# Patient Record
Sex: MALE | Race: WHITE | NOT HISPANIC OR LATINO | URBAN - METROPOLITAN AREA
[De-identification: names, ages, dates, MRNs, and addresses within clinical notes are randomized per-mention and may not be internally consistent; named-entity substitution may affect disease eponyms.]

---

## 2021-07-14 ENCOUNTER — INPATIENT (INPATIENT)
Facility: HOSPITAL | Age: 61
LOS: 11 days | Discharge: HOME | End: 2021-07-26
Attending: PSYCHIATRY & NEUROLOGY | Admitting: PSYCHIATRY & NEUROLOGY
Payer: COMMERCIAL

## 2021-07-14 VITALS
SYSTOLIC BLOOD PRESSURE: 130 MMHG | DIASTOLIC BLOOD PRESSURE: 86 MMHG | TEMPERATURE: 98 F | OXYGEN SATURATION: 100 % | RESPIRATION RATE: 18 BRPM | HEART RATE: 69 BPM

## 2021-07-14 NOTE — ED ADULT TRIAGE NOTE - CHIEF COMPLAINT QUOTE
Pt arrives c/o suicidal ideation. Pt states he was recently d/c earlier today from [facility] in [place] for suicide attempt. States he is having worsening suicidal thoughts. Reports he is compliant with medications. Denies HI, AH/VH, drug or ETOH use. To be seen in  as per NP. PMHx MDD    Alanna: [phone] (wife)

## 2021-07-14 NOTE — ED ADULT NURSE NOTE - OBJECTIVE STATEMENT
Pt received to behavioral health. Pt A&Ox4, ambulatory. Pt states that he was in [place] for the past 2 weeks because he had a plan to kill himself. Pt states that he went to [place] because he always wanted to see [place] and wanted to go to some place cooler since it was so hot here. Pt states that he tried to fill his car up with gasoline but stopped when he felt himself feeling bad. Pt states that he then went to the store and bought a rope and had a plan to jump off of a wendi into a river. Pt states that he never went through with that plan because [place] that made him think of his sons at home. Pt resided at home with his wife and states that he has been having martial issues over the past 20 years but they have been worsening since March of this year. Pt states that he drove home from [place] today and felt that he couldn't return home to his wife so he came to the hospital today. Pt currently endorses SI without a plan. Denies HI, hallucinations. Respirations equal and unlabored, no acute distress noted. Denies chest pain, palpitations, SOB, headaches, dizziness, weakness, fever, cough, chills, N/V/D, recent sick contacts. PMH MDD, pt states he is compliant with his medications. Pt undressed, belongings and valuables secured in  locker 5. Vital signs as noted, comfort measures provided, safety maintained. Assessment ongoing. Pt received to behavioral health. Pt A&Ox4, ambulatory. Pt states that he was in [place] for the past 2 weeks because he had a plan to kill himself. Pt states that he went to [place] because he always wanted to see [place] and wanted to go to some place cooler since it was so hot here. Pt states that he tried to fill his car up with carbon monoxide but stopped when he starting feeling sick. Pt states that he then went to the store and bought a rope and had a plan to jump off of a wendi into a river. Pt states that he never went through with that plan because he [place] a [place] at the [place] that made him think of his sons at home. Pt resides at home with his wife and states that he has been having martial issues over the past 20 years but they have been worsening since March of this year. Pt states that he drove home from [place] today and felt that he couldn't return home to his wife since he was felt that he would drive off again, so he came to the hospital today. Pt currently endorses SI without a plan. Denies HI, hallucinations. Respirations equal and unlabored, no acute distress noted. Denies chest pain, palpitations, SOB, headaches, dizziness, weakness, fever, cough, chills, N/V/D, recent sick contacts. PMH MDD, pt states he is compliant with his medications. Pt undressed, belongings and valuables secured in  locker 5. Vital signs as noted, comfort measures provided, safety maintained. Assessment ongoing. Pt received to behavioral health. Pt A&Ox4, ambulatory. Pt states that he was in [place] for the past 2 weeks because he had a plan to kill himself. Pt states that he went to [place] because he always wanted to see [place] and wanted to go to some place cooler since it was so hot here. Pt states that he tried to fill his car up with carbon monoxide but stopped when he starting feeling sick. Pt states that he then went to the store and bought a rope and had a plan to jump off of a wendi into a river. Pt states that he never went through with that plan because he [place] a [place] that made him think of his sons at home. Pt resides at home with his wife and states that he has been having martial issues over the past 20 years but they have been worsening since March of this year. Pt states that he drove home from [place] today and felt that he couldn't return home to his wife since he was felt that he would drive off again, so he came to the hospital today. Pt currently endorses SI without a plan. Denies HI, hallucinations. Respirations equal and unlabored, no acute distress noted. Denies chest pain, palpitations, SOB, headaches, dizziness, weakness, fever, cough, chills, N/V/D, recent sick contacts. PMH MDD, pt states he is compliant with his medications. Pt undressed, belongings and valuables secured in  locker 5. Labs drawn and sent as ordered. EKG performed. Vital signs as noted, comfort measures provided, safety maintained. Assessment ongoing.

## 2021-07-14 NOTE — ED PROVIDER NOTE - CLINICAL SUMMARY MEDICAL DECISION MAKING FREE TEXT BOX
This is a 61 yr M, pmh MDD with c/o anxiety, depression , and si. He reports was discharged today from [facility] hospital at [place]. He was there since July 1 because of si and attempt , via co and rope. PT states he did not finish it because saw 2 boys and reminded him of his son. Pt states he has marital problems, afraid to go home because believes he would take off again and finish the job.   Psych consult requested-

## 2021-07-14 NOTE — ED PROVIDER NOTE - OBJECTIVE STATEMENT
This is a 61 yr M, pmh MDD with c/o anxiety, depression , and si. He reports was discharged today from [facility] at [place]. He was there since July 1 because of si and attempt , via co and rope. PT states he did not finish it because saw 2 boys and reminded him of his son. Pt states he has marital problems, afraid to go home because believes he would take off again and finish the job.

## 2021-07-14 NOTE — ED ADULT NURSE NOTE - CHIEF COMPLAINT QUOTE
Pt arrives c/o suicidal ideation. Pt states he was recently d/c earlier today from [facility] in [place] for suicide attempt. States he is having worsening suicidal thoughts. Reports he is compliant with medications. Denies HI, AH/VH, drug or ETOH use. To be seen in  as per NP. PMHx MDD    Alanna: [phone] (wife)

## 2021-07-14 NOTE — ED ADULT TRIAGE NOTE - RESPIRATORY RATE (BREATHS/MIN)
18 Vital Signs Last 24 Hrs  T(C): 36.8 (28 Jun 2021 00:46), Max: 37 (27 Jun 2021 20:55)  T(F): 98.2 (28 Jun 2021 00:46), Max: 98.6 (27 Jun 2021 20:55)  HR: 66 (28 Jun 2021 00:46) (66 - 82)  BP: 125/83 (28 Jun 2021 00:46) (112/75 - 129/85)  BP(mean): --  RR: 17 (28 Jun 2021 00:46) (16 - 17)  SpO2: 100% (28 Jun 2021 00:46) (100% - 100%)                          10.3   8.07  )-----------( 302      ( 27 Jun 2021 18:45 )             32.5     06-27    139  |  106  |  17  ----------------------------<  90  4.3   |  24  |  0.84    Ca    8.5      27 Jun 2021 18:42  Mg     2.1     06-27    TPro  5.6<L>  /  Alb  3.6  /  TBili  <0.2  /  DBili  x   /  AST  16  /  ALT  9   /  AlkPhos  40  06-27    Troponin: 24    COVID 19 PCR: Negative    CXR:   FINDINGS:    No acute osseous abnormality. The cardiomediastinal silhouette is unremarkable. The lungs are clear. There is no pleural effusion or pneumothorax.    IMPRESSION:    Clear lungs.    EKG: Sinus rhythm at 77 bpm. QT/QTc 370/418.

## 2021-07-14 NOTE — ED PROVIDER NOTE - PROGRESS NOTE DETAILS
CLARITA: I was signed out this pt pending Bed availability for psychiatric care. Pt has no complaints. Will continue to monitor pending bed. Preston: Pt was signed out pending bed availability. Pt to be admitted to [facility], Dr. Brown.

## 2021-07-15 DIAGNOSIS — F60.5 OBSESSIVE-COMPULSIVE PERSONALITY DISORDER: ICD-10-CM

## 2021-07-15 DIAGNOSIS — F33.2 MAJOR DEPRESSIVE DISORDER, RECURRENT SEVERE WITHOUT PSYCHOTIC FEATURES: ICD-10-CM

## 2021-07-15 LAB
ALBUMIN SERPL ELPH-MCNC: 4.3 G/DL — SIGNIFICANT CHANGE UP (ref 3.3–5)
ALP SERPL-CCNC: 50 U/L — SIGNIFICANT CHANGE UP (ref 40–120)
ALT FLD-CCNC: 20 U/L — SIGNIFICANT CHANGE UP (ref 4–41)
AMPHET UR-MCNC: NEGATIVE — SIGNIFICANT CHANGE UP
ANION GAP SERPL CALC-SCNC: 13 MMOL/L — SIGNIFICANT CHANGE UP (ref 7–14)
APAP SERPL-MCNC: <15 UG/ML — SIGNIFICANT CHANGE UP (ref 15–25)
APPEARANCE UR: CLEAR — SIGNIFICANT CHANGE UP
AST SERPL-CCNC: 16 U/L — SIGNIFICANT CHANGE UP (ref 4–40)
BACTERIA # UR AUTO: NEGATIVE — SIGNIFICANT CHANGE UP
BARBITURATES UR SCN-MCNC: NEGATIVE — SIGNIFICANT CHANGE UP
BASOPHILS # BLD AUTO: 0.09 K/UL — SIGNIFICANT CHANGE UP (ref 0–0.2)
BASOPHILS NFR BLD AUTO: 1.1 % — SIGNIFICANT CHANGE UP (ref 0–2)
BENZODIAZ UR-MCNC: NEGATIVE — SIGNIFICANT CHANGE UP
BILIRUB SERPL-MCNC: 0.3 MG/DL — SIGNIFICANT CHANGE UP (ref 0.2–1.2)
BILIRUB UR-MCNC: NEGATIVE — SIGNIFICANT CHANGE UP
BUN SERPL-MCNC: 13 MG/DL — SIGNIFICANT CHANGE UP (ref 7–23)
CALCIUM SERPL-MCNC: 8.9 MG/DL — SIGNIFICANT CHANGE UP (ref 8.4–10.5)
CHLORIDE SERPL-SCNC: 102 MMOL/L — SIGNIFICANT CHANGE UP (ref 98–107)
CO2 SERPL-SCNC: 24 MMOL/L — SIGNIFICANT CHANGE UP (ref 22–31)
COCAINE METAB.OTHER UR-MCNC: NEGATIVE — SIGNIFICANT CHANGE UP
COLOR SPEC: YELLOW — SIGNIFICANT CHANGE UP
COVID-19 SPIKE DOMAIN AB INTERP: NEGATIVE — SIGNIFICANT CHANGE UP
COVID-19 SPIKE DOMAIN ANTIBODY RESULT: 0.4 U/ML — SIGNIFICANT CHANGE UP
CREAT SERPL-MCNC: 1.35 MG/DL — HIGH (ref 0.5–1.3)
CREATININE URINE RESULT, DAU: 160 MG/DL — SIGNIFICANT CHANGE UP
DIFF PNL FLD: NEGATIVE — SIGNIFICANT CHANGE UP
EOSINOPHIL # BLD AUTO: 0.24 K/UL — SIGNIFICANT CHANGE UP (ref 0–0.5)
EOSINOPHIL NFR BLD AUTO: 3 % — SIGNIFICANT CHANGE UP (ref 0–6)
ETHANOL SERPL-MCNC: <10 MG/DL — SIGNIFICANT CHANGE UP
GLUCOSE SERPL-MCNC: 89 MG/DL — SIGNIFICANT CHANGE UP (ref 70–99)
GLUCOSE UR QL: NEGATIVE — SIGNIFICANT CHANGE UP
HCT VFR BLD CALC: 44.6 % — SIGNIFICANT CHANGE UP (ref 39–50)
HGB BLD-MCNC: 14.5 G/DL — SIGNIFICANT CHANGE UP (ref 13–17)
IANC: 4.81 K/UL — SIGNIFICANT CHANGE UP (ref 1.5–8.5)
IMM GRANULOCYTES NFR BLD AUTO: 0.4 % — SIGNIFICANT CHANGE UP (ref 0–1.5)
KETONES UR-MCNC: NEGATIVE — SIGNIFICANT CHANGE UP
LEUKOCYTE ESTERASE UR-ACNC: NEGATIVE — SIGNIFICANT CHANGE UP
LITHIUM SERPL-MCNC: 0.5 MMOL/L — LOW (ref 0.6–1.2)
LYMPHOCYTES # BLD AUTO: 2.19 K/UL — SIGNIFICANT CHANGE UP (ref 1–3.3)
LYMPHOCYTES # BLD AUTO: 27.7 % — SIGNIFICANT CHANGE UP (ref 13–44)
MCHC RBC-ENTMCNC: 29.8 PG — SIGNIFICANT CHANGE UP (ref 27–34)
MCHC RBC-ENTMCNC: 32.5 GM/DL — SIGNIFICANT CHANGE UP (ref 32–36)
MCV RBC AUTO: 91.8 FL — SIGNIFICANT CHANGE UP (ref 80–100)
METHADONE UR-MCNC: NEGATIVE — SIGNIFICANT CHANGE UP
MONOCYTES # BLD AUTO: 0.55 K/UL — SIGNIFICANT CHANGE UP (ref 0–0.9)
MONOCYTES NFR BLD AUTO: 7 % — SIGNIFICANT CHANGE UP (ref 2–14)
NEUTROPHILS # BLD AUTO: 4.81 K/UL — SIGNIFICANT CHANGE UP (ref 1.8–7.4)
NEUTROPHILS NFR BLD AUTO: 60.8 % — SIGNIFICANT CHANGE UP (ref 43–77)
NITRITE UR-MCNC: NEGATIVE — SIGNIFICANT CHANGE UP
NRBC # BLD: 0 /100 WBCS — SIGNIFICANT CHANGE UP
NRBC # FLD: 0 K/UL — SIGNIFICANT CHANGE UP
OPIATES UR-MCNC: NEGATIVE — SIGNIFICANT CHANGE UP
OXYCODONE UR-MCNC: NEGATIVE — SIGNIFICANT CHANGE UP
PCP SPEC-MCNC: SIGNIFICANT CHANGE UP
PCP UR-MCNC: NEGATIVE — SIGNIFICANT CHANGE UP
PH UR: 6.5 — SIGNIFICANT CHANGE UP (ref 5–8)
PLATELET # BLD AUTO: 250 K/UL — SIGNIFICANT CHANGE UP (ref 150–400)
POTASSIUM SERPL-MCNC: 4.1 MMOL/L — SIGNIFICANT CHANGE UP (ref 3.5–5.3)
POTASSIUM SERPL-SCNC: 4.1 MMOL/L — SIGNIFICANT CHANGE UP (ref 3.5–5.3)
PROT SERPL-MCNC: 6.8 G/DL — SIGNIFICANT CHANGE UP (ref 6–8.3)
PROT UR-MCNC: ABNORMAL
RBC # BLD: 4.86 M/UL — SIGNIFICANT CHANGE UP (ref 4.2–5.8)
RBC # FLD: 13 % — SIGNIFICANT CHANGE UP (ref 10.3–14.5)
RBC CASTS # UR COMP ASSIST: 1 /HPF — SIGNIFICANT CHANGE UP (ref 0–4)
SALICYLATES SERPL-MCNC: <5 MG/DL — LOW (ref 15–30)
SARS-COV-2 IGG+IGM SERPL QL IA: 0.4 U/ML — SIGNIFICANT CHANGE UP
SARS-COV-2 IGG+IGM SERPL QL IA: NEGATIVE — SIGNIFICANT CHANGE UP
SARS-COV-2 RNA SPEC QL NAA+PROBE: SIGNIFICANT CHANGE UP
SODIUM SERPL-SCNC: 139 MMOL/L — SIGNIFICANT CHANGE UP (ref 135–145)
SP GR SPEC: 1.02 — SIGNIFICANT CHANGE UP (ref 1.01–1.02)
THC UR QL: NEGATIVE — SIGNIFICANT CHANGE UP
TOXICOLOGY SCREEN, DRUGS OF ABUSE, SERUM RESULT: SIGNIFICANT CHANGE UP
TSH SERPL-MCNC: 8.35 UIU/ML — HIGH (ref 0.27–4.2)
UROBILINOGEN FLD QL: SIGNIFICANT CHANGE UP
WBC # BLD: 7.91 K/UL — SIGNIFICANT CHANGE UP (ref 3.8–10.5)
WBC # FLD AUTO: 7.91 K/UL — SIGNIFICANT CHANGE UP (ref 3.8–10.5)
WBC UR QL: 1 /HPF — SIGNIFICANT CHANGE UP (ref 0–5)

## 2021-07-15 PROCEDURE — 99285 EMERGENCY DEPT VISIT HI MDM: CPT

## 2021-07-15 RX ORDER — FLUVOXAMINE MALEATE 25 MG/1
200 TABLET ORAL DAILY
Refills: 0 | Status: DISCONTINUED | OUTPATIENT
Start: 2021-07-15 | End: 2021-07-26

## 2021-07-15 RX ORDER — LITHIUM CARBONATE 300 MG/1
600 TABLET, EXTENDED RELEASE ORAL DAILY
Refills: 0 | Status: DISCONTINUED | OUTPATIENT
Start: 2021-07-15 | End: 2021-07-15

## 2021-07-15 RX ORDER — HYDROXYZINE HCL 10 MG
25 TABLET ORAL EVERY 6 HOURS
Refills: 0 | Status: DISCONTINUED | OUTPATIENT
Start: 2021-07-15 | End: 2021-07-19

## 2021-07-15 RX ORDER — LITHIUM CARBONATE 300 MG/1
750 TABLET, EXTENDED RELEASE ORAL DAILY
Refills: 0 | Status: DISCONTINUED | OUTPATIENT
Start: 2021-07-15 | End: 2021-07-15

## 2021-07-15 RX ORDER — LITHIUM CARBONATE 300 MG/1
750 TABLET, EXTENDED RELEASE ORAL DAILY
Refills: 0 | Status: DISCONTINUED | OUTPATIENT
Start: 2021-07-15 | End: 2021-07-16

## 2021-07-15 NOTE — ED BEHAVIORAL HEALTH ASSESSMENT NOTE - SUMMARY
Pt is a 62 y/o male domiciled, employed as a  (currently on medical leave), with PPH of MDD, 2 past inpatient hospitalization, most recent at [facility] in [place] (d/c today 7/14/21), reports of  2 past aborted SA (contemplating shooting himself with a shot gun and 2weeks attempted via carbon monoxide poisoning) currently in outpatient treatment with Dr Luke Brwon, prescribed Luvox 200 mg and lithium 600 mg daily, no reported hx of violence/aggression/legal issues, denies past and current substance use, social Etoh use, no PMH, today presented to the ED after being d/c from inpatient unit in [place] for worsening suicidal thoughts.    Pt presents to the ED in the context of suicidal ideation. Pt endorses worsening depressive symptoms with anxious distress and acute onset of recent incidents of suicidal ideation with plan to hang himself. He is unable to safety plan outside of the hospital and is requesting voluntary admission.  - there are no beds, no will hold in the ED until a bed becomes available Pt is a 62 y/o male domiciled, employed as a  (currently on medical leave), with PPH of MDD, 2 past inpatient hospitalization, most recent at [facility] in [place] (d/c today 7/14/21), reports of  2 past aborted SA (contemplating shooting himself with a shot gun and on 6/30/21, attempted via carbon monoxide poisoning) currently in outpatient treatment with Dr Luke Brown, prescribed Luvox 200 mg and lithium 600 mg daily, no reported hx of violence/aggression/legal issues, denies past and current substance use, social Etoh use, no PMH, today presented to the ED after being d/c from inpatient unit in [place] for worsening suicidal thoughts.      Pt presents to the ED in the context of suicidal ideation. Pt endorses worsening depressive symptoms with anxious distress and acute onset of recent incidents of suicidal ideation with plan to hang himself. He is unable to safety plan outside of the hospital and is requesting voluntary admission.  - there are no beds available so will hold in the ED until a bed becomes available.

## 2021-07-15 NOTE — ED ADULT NURSE REASSESSMENT NOTE - NS ED NURSE REASSESS COMMENT FT1
Pt sleeping comfortably at this time. Nonverbal indicators of pain absent. Respirations equal and unlabored, no acute distress noted. Pt awaiting inpatient psychiatric admission when bed made available. Assessment ongoing.

## 2021-07-15 NOTE — ED BEHAVIORAL HEALTH ASSESSMENT NOTE - DESCRIPTION
see hpi none Anxious appearing but cooperative   Vital Signs Last 24 Hrs  T(C): 36.8 (14 Jul 2021 22:52), Max: 36.8 (14 Jul 2021 22:52)  T(F): 98.2 (14 Jul 2021 22:52), Max: 98.2 (14 Jul 2021 22:52)  HR: 69 (14 Jul 2021 22:52) (69 - 69)  BP: 130/86 (14 Jul 2021 22:52) (130/86 - 130/86)  BP(mean): --  RR: 18 (14 Jul 2021 22:52) (18 - 18)  SpO2: 100% (14 Jul 2021 22:52) (100% - 100%)
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
## 2021-07-15 NOTE — ED BEHAVIORAL HEALTH ASSESSMENT NOTE - PSYCHIATRIC ISSUES AND PLAN (INCLUDE STANDING AND PRN MEDICATION)
continue Fluvoxamine 200mg daily, lithium 600 mg daily, prn hydroxyzine 25 mg for anxiety, ativan 2 mg PO/Im for severe anxiety/agitation

## 2021-07-15 NOTE — BH INPATIENT PSYCHIATRY ASSESSMENT NOTE - NSCOMMENTSUICRISKFACT_PSY_ALL_CORE
Risk factors include Hx of SI and suicidal behavior, recent hospitalization, male gender, mood disorder, personality disorder traits.

## 2021-07-15 NOTE — ED BEHAVIORAL HEALTH NOTE - BEHAVIORAL HEALTH NOTE
Patient seen this AM.   He reports that he did not sleep very well because of the other noises in the ED.   He is continuing to report SI and continues to request voluntary psych hospitalization.     Vital Signs Last 24 Hrs  T(C): 36.9 (15 Jul 2021 12:39), Max: 36.9 (15 Jul 2021 12:39)  T(F): 98.5 (15 Jul 2021 12:39), Max: 98.5 (15 Jul 2021 12:39)  HR: 72 (15 Jul 2021 12:39) (69 - 77)  BP: 124/82 (15 Jul 2021 12:39) (111/69 - 130/86)  BP(mean): --  RR: 16 (15 Jul 2021 12:39) (16 - 18)  SpO2: 100% (15 Jul 2021 12:39) (100% - 100%)    Pt will be admitted to L4 on 9.13 status.   Handoff left for Frances RHODES voicemail.

## 2021-07-15 NOTE — BH INPATIENT PSYCHIATRY ASSESSMENT NOTE - NSSUICPROTFACT_PSY_ALL_CORE
Responsibility to children, family, or others/Supportive social network of family or friends/Fear of death or the actual act of killing self/Positive therapeutic relationships

## 2021-07-15 NOTE — BH INPATIENT PSYCHIATRY ASSESSMENT NOTE - RISK ASSESSMENT
Pt presents as an imminent risk of harm to self due to his current depression and anxious distress, recent suicide attempt, and continued expression of suicidal ideation. Protective factors include his support wife, engaged in treatment, and wants to get better.   He requires inpatient hospitalization due to his persistent suicidality.
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
## 2021-07-15 NOTE — BH INPATIENT PSYCHIATRY ASSESSMENT NOTE - NSBHMETABOLIC_PSY_ALL_CORE_FT
BMI: BMI (kg/m2): 25.4 (07-15-21 @ 14:25)  HbA1c:   Glucose:   BP: 124/82 (07-15-21 @ 12:39) (111/69 - 130/86)  Lipid Panel:
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
## 2021-07-15 NOTE — ED ADULT NURSE REASSESSMENT NOTE - NS ED NURSE REASSESS COMMENT FT1
Pt A&Ox4. Pt awake, calm and cooperative at this time. Pt states that he was able to sleep well tonight. Pt without complaints at this time. Respirations equal and unlabored, no acute distress noted. Denies chest pain, palpitations, SOB, headaches, dizziness, weakness. Vital signs as noted, comfort measures provided, safety maintained. Pt awaiting inpatient psychiatric admission when bed is made available. Assessment ongoing.
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
## 2021-07-15 NOTE — BH INPATIENT PSYCHIATRY ASSESSMENT NOTE - CURRENT MEDICATION
MEDICATIONS  (STANDING):  fluvoxaMINE 200 milliGRAM(s) Oral daily  lithium 750 milliGRAM(s) Oral daily    MEDICATIONS  (PRN):  hydrOXYzine hydrochloride 25 milliGRAM(s) Oral every 6 hours PRN Anxiety  LORazepam     Tablet 2 milliGRAM(s) Oral every 6 hours PRN severe anxiety  LORazepam   Injectable 2 milliGRAM(s) IntraMuscular once PRN severe anxiety

## 2021-07-15 NOTE — ED BEHAVIORAL HEALTH NOTE - BEHAVIORAL HEALTH NOTE
COVID Exposure Screen- Patient     *Have you had a COVID-19 test in the last 90 days?  (  X) Yes   (  ) No   (  ) Unknown- Reason: _____     IF YES PROCEED TO QUESTION #2. IF NO OR UNKNOWN, PLEASE SKIP TO QUESTION #3.     Date of test(s) and result(s): ____7/1/21 (-)____     *Have you tested positive for COVID-19 antibodies? (  ) Yes   ( X ) No   (  ) Unknown- Reason: _____     IF YES PROCEED TO QUESTION #4. IF NO or UNKNOWN, PLEASE SKIP TO QUESTION #5.     Date of positive antibody test: ________     *Have you received 2 doses of the COVID-19 vaccine? (  ) Yes   ( X ) No   (  ) Unknown- Reason: _____      IF YES PROCEED TO QUESTION #6. IF NO or UNKNOWN, PLEASE SKIP TO QUESTION #7.     Date of second dose: ________     *In the past 10 days, have you been around anyone with a positive COVID-19 test?* (  ) Yes   (X  ) No   (  ) Unknown- Reason: ____     IF YES PROCEED TO QUESTION #8. IF NO or UNKNOWN, PLEASE SKIP TO QUESTION #13.     Were you within 6 feet of them for at least 15 minutes? (  ) Yes   (  ) No   (  ) Unknown- Reason: _____     Have you provided care for them? (  ) Yes   (  ) No   (  ) Unknown- Reason: ______     Have you had direct physical contact with them (touched, hugged, or kissed them)? (  ) Yes   (  ) No    (  ) Unknown- Reason: _____     Have you shared eating or drinking utensils with them? (  ) Yes   (  ) No    (  ) Unknown- Reason: ____     Have they sneezed, coughed, or somehow gotten respiratory droplets on you? (  ) Yes   (  ) No    (  ) Unknown- Reason: ______     *Have you been out of [place] within the past 10 days?* (  X) Yes   (  ) No   (  ) Unknown- Reason: _____     IF YES PLEASE ANSWER THE FOLLOWING QUESTIONS:     Which state/country have you been to? __[place] ____     Were you there over 24 hours? ( X ) Yes   (  ) No    (  ) Unknown- Reason: ______     Date of return to [facility]: __7/14/21____

## 2021-07-15 NOTE — ED BEHAVIORAL HEALTH ASSESSMENT NOTE - RISK ASSESSMENT
Pt presents as an imminent risk of harm to self due to his current depression and anxious distress, recent suicide attempt, and continued expression of suicidal ideation. Protective factors include his support wife, engaged in treatment, and wants to get better.     He requires inpatient hospitalization due to his persistent suicidality. High Acute Suicide Risk Pt presents as an imminent risk of harm to self due to his current depression and anxious distress, recent suicide attempt, and continued expression of suicidal ideation. Protective factors include his support wife, engaged in treatment, and wants to get better.   He requires inpatient hospitalization due to his persistent suicidality.

## 2021-07-15 NOTE — BH INPATIENT PSYCHIATRY ASSESSMENT NOTE - NSBHCHARTREVIEWVS_PSY_A_CORE FT
Vital Signs Last 24 Hrs  T(C): 36.5 (15 Jul 2021 14:40), Max: 36.9 (15 Jul 2021 12:39)  T(F): 97.7 (15 Jul 2021 14:40), Max: 98.5 (15 Jul 2021 12:39)  HR: 72 (15 Jul 2021 12:39) (69 - 77)  BP: 124/82 (15 Jul 2021 12:39) (111/69 - 130/86)  BP(mean): --  RR: 16 (15 Jul 2021 14:40) (16 - 18)  SpO2: 99% (15 Jul 2021 14:25) (99% - 100%)

## 2021-07-16 LAB
A1C WITH ESTIMATED AVERAGE GLUCOSE RESULT: 5.5 % — SIGNIFICANT CHANGE UP (ref 4–5.6)
ALBUMIN SERPL ELPH-MCNC: 4.4 G/DL — SIGNIFICANT CHANGE UP (ref 3.3–5)
ALP SERPL-CCNC: 53 U/L — SIGNIFICANT CHANGE UP (ref 40–120)
ALT FLD-CCNC: 20 U/L — SIGNIFICANT CHANGE UP (ref 4–41)
ANION GAP SERPL CALC-SCNC: 15 MMOL/L — HIGH (ref 7–14)
AST SERPL-CCNC: 21 U/L — SIGNIFICANT CHANGE UP (ref 4–40)
BILIRUB SERPL-MCNC: 0.6 MG/DL — SIGNIFICANT CHANGE UP (ref 0.2–1.2)
BUN SERPL-MCNC: 14 MG/DL — SIGNIFICANT CHANGE UP (ref 7–23)
CALCIUM SERPL-MCNC: 9.5 MG/DL — SIGNIFICANT CHANGE UP (ref 8.4–10.5)
CHLORIDE SERPL-SCNC: 99 MMOL/L — SIGNIFICANT CHANGE UP (ref 98–107)
CHOLEST SERPL-MCNC: 176 MG/DL — SIGNIFICANT CHANGE UP
CO2 SERPL-SCNC: 22 MMOL/L — SIGNIFICANT CHANGE UP (ref 22–31)
CREAT SERPL-MCNC: 1.44 MG/DL — HIGH (ref 0.5–1.3)
ESTIMATED AVERAGE GLUCOSE: 111 — SIGNIFICANT CHANGE UP
GLUCOSE SERPL-MCNC: 85 MG/DL — SIGNIFICANT CHANGE UP (ref 70–99)
HDLC SERPL-MCNC: 49 MG/DL — SIGNIFICANT CHANGE UP
LIPID PNL WITH DIRECT LDL SERPL: 98 MG/DL — SIGNIFICANT CHANGE UP
NON HDL CHOLESTEROL: 127 MG/DL — SIGNIFICANT CHANGE UP
POTASSIUM SERPL-MCNC: 4.6 MMOL/L — SIGNIFICANT CHANGE UP (ref 3.5–5.3)
POTASSIUM SERPL-SCNC: 4.6 MMOL/L — SIGNIFICANT CHANGE UP (ref 3.5–5.3)
PROT SERPL-MCNC: 7.2 G/DL — SIGNIFICANT CHANGE UP (ref 6–8.3)
SODIUM SERPL-SCNC: 136 MMOL/L — SIGNIFICANT CHANGE UP (ref 135–145)
TRIGL SERPL-MCNC: 147 MG/DL — SIGNIFICANT CHANGE UP

## 2021-07-16 PROCEDURE — 99232 SBSQ HOSP IP/OBS MODERATE 35: CPT | Mod: 25

## 2021-07-16 PROCEDURE — 90853 GROUP PSYCHOTHERAPY: CPT

## 2021-07-16 RX ORDER — ARIPIPRAZOLE 15 MG/1
5 TABLET ORAL DAILY
Refills: 0 | Status: DISCONTINUED | OUTPATIENT
Start: 2021-07-16 | End: 2021-07-19

## 2021-07-16 RX ADMIN — FLUVOXAMINE MALEATE 200 MILLIGRAM(S): 25 TABLET ORAL at 08:02

## 2021-07-16 RX ADMIN — LITHIUM CARBONATE 750 MILLIGRAM(S): 300 TABLET, EXTENDED RELEASE ORAL at 08:02

## 2021-07-16 NOTE — PSYCHIATRIC REHAB INITIAL EVALUATION - NSBHPRRECOMMEND_PSY_ALL_CORE
Writer met with patient in order to orient patient to unit and briefly introduce patient to psychiatric rehabilitation staff and department function. Patient was provided with copy of unit schedule. Patient is a reliable historian. Patient reports currently being admitted due to increased SI and anxiety. Patient states that anxiety and SI has been triggered by difficulties in current marriage. Pt states that he recently had thoughts to hang self, shoot self with shotgun, and poison self in car before seeking treatment. Patient denies current SI and is able to contract for safety. Patient and writer established a collaborative psychiatric rehabilitation goal. Writer encouraged patient to attend psychiatric rehabilitation groups and engage in treatment. Psychiatric rehabilitation staff will engage patient daily.     Patient was encouraged by writer to practice social distancing while on unit and wear face mask. Patient was receptive.
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
## 2021-07-16 NOTE — BH SOCIAL WORK INITIAL PSYCHOSOCIAL EVALUATION - NSBHBARRIERS_PSY_ALL_CORE
Chart is reviewed. Patient has history of palpitations and some ectopy. Has significant anxiety. If he has recurrent problems  get a 48-hour Holter monitor and follow-up.
Poor judgement

## 2021-07-16 NOTE — BH SOCIAL WORK INITIAL PSYCHOSOCIAL EVALUATION - BILL PAYMENT
This [facility] RN assisted Dr. Rosalinda Menard with recommendation for a RB stereo biopsy for density. Procedure reviewed and all questions answered. Emotional and educational support given.    On the day of the biopsy, pt instructed to take Tylenol 1000mg PO, ea no
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
## 2021-07-16 NOTE — BH SOCIAL WORK INITIAL PSYCHOSOCIAL EVALUATION - NSCMSPTSTRENGTHS_PSY_ALL_CORE
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

## 2021-07-17 PROCEDURE — 99231 SBSQ HOSP IP/OBS SF/LOW 25: CPT

## 2021-07-17 RX ADMIN — Medication 2 MILLIGRAM(S): at 21:27

## 2021-07-17 RX ADMIN — ARIPIPRAZOLE 5 MILLIGRAM(S): 15 TABLET ORAL at 08:09

## 2021-07-17 RX ADMIN — FLUVOXAMINE MALEATE 200 MILLIGRAM(S): 25 TABLET ORAL at 08:10

## 2021-07-18 PROCEDURE — 99231 SBSQ HOSP IP/OBS SF/LOW 25: CPT

## 2021-07-18 RX ORDER — ACETAMINOPHEN 500 MG
650 TABLET ORAL EVERY 6 HOURS
Refills: 0 | Status: DISCONTINUED | OUTPATIENT
Start: 2021-07-18 | End: 2021-07-26

## 2021-07-18 RX ADMIN — ARIPIPRAZOLE 5 MILLIGRAM(S): 15 TABLET ORAL at 08:28

## 2021-07-18 RX ADMIN — FLUVOXAMINE MALEATE 200 MILLIGRAM(S): 25 TABLET ORAL at 08:28

## 2021-07-18 RX ADMIN — Medication 650 MILLIGRAM(S): at 12:38

## 2021-07-18 RX ADMIN — Medication 650 MILLIGRAM(S): at 13:00

## 2021-07-18 RX ADMIN — Medication 2 MILLIGRAM(S): at 21:09

## 2021-07-19 LAB
ALBUMIN SERPL ELPH-MCNC: 4.5 G/DL — SIGNIFICANT CHANGE UP (ref 3.3–5)
ALP SERPL-CCNC: 53 U/L — SIGNIFICANT CHANGE UP (ref 40–120)
ALT FLD-CCNC: 22 U/L — SIGNIFICANT CHANGE UP (ref 4–41)
ANION GAP SERPL CALC-SCNC: 14 MMOL/L — SIGNIFICANT CHANGE UP (ref 7–14)
AST SERPL-CCNC: 16 U/L — SIGNIFICANT CHANGE UP (ref 4–40)
BILIRUB SERPL-MCNC: 0.5 MG/DL — SIGNIFICANT CHANGE UP (ref 0.2–1.2)
BUN SERPL-MCNC: 16 MG/DL — SIGNIFICANT CHANGE UP (ref 7–23)
CALCIUM SERPL-MCNC: 9.3 MG/DL — SIGNIFICANT CHANGE UP (ref 8.4–10.5)
CHLORIDE SERPL-SCNC: 100 MMOL/L — SIGNIFICANT CHANGE UP (ref 98–107)
CO2 SERPL-SCNC: 26 MMOL/L — SIGNIFICANT CHANGE UP (ref 22–31)
CREAT SERPL-MCNC: 1.38 MG/DL — HIGH (ref 0.5–1.3)
GLUCOSE SERPL-MCNC: 95 MG/DL — SIGNIFICANT CHANGE UP (ref 70–99)
POTASSIUM SERPL-MCNC: 4.2 MMOL/L — SIGNIFICANT CHANGE UP (ref 3.5–5.3)
POTASSIUM SERPL-SCNC: 4.2 MMOL/L — SIGNIFICANT CHANGE UP (ref 3.5–5.3)
PROT SERPL-MCNC: 6.9 G/DL — SIGNIFICANT CHANGE UP (ref 6–8.3)
SODIUM SERPL-SCNC: 140 MMOL/L — SIGNIFICANT CHANGE UP (ref 135–145)
T4 FREE SERPL-MCNC: 1.2 NG/DL — SIGNIFICANT CHANGE UP (ref 0.9–1.8)
TSH SERPL-MCNC: 2.24 UIU/ML — SIGNIFICANT CHANGE UP (ref 0.27–4.2)

## 2021-07-19 PROCEDURE — 99232 SBSQ HOSP IP/OBS MODERATE 35: CPT

## 2021-07-19 PROCEDURE — 90837 PSYTX W PT 60 MINUTES: CPT

## 2021-07-19 RX ORDER — HYDROXYZINE HCL 10 MG
50 TABLET ORAL EVERY 6 HOURS
Refills: 0 | Status: DISCONTINUED | OUTPATIENT
Start: 2021-07-19 | End: 2021-07-26

## 2021-07-19 RX ORDER — QUETIAPINE FUMARATE 200 MG/1
50 TABLET, FILM COATED ORAL AT BEDTIME
Refills: 0 | Status: DISCONTINUED | OUTPATIENT
Start: 2021-07-19 | End: 2021-07-20

## 2021-07-19 RX ADMIN — QUETIAPINE FUMARATE 50 MILLIGRAM(S): 200 TABLET, FILM COATED ORAL at 21:12

## 2021-07-19 RX ADMIN — FLUVOXAMINE MALEATE 200 MILLIGRAM(S): 25 TABLET ORAL at 08:07

## 2021-07-20 LAB — SARS-COV-2 RNA SPEC QL NAA+PROBE: SIGNIFICANT CHANGE UP

## 2021-07-20 PROCEDURE — 90853 GROUP PSYCHOTHERAPY: CPT

## 2021-07-20 PROCEDURE — 99232 SBSQ HOSP IP/OBS MODERATE 35: CPT | Mod: 25

## 2021-07-20 RX ORDER — QUETIAPINE FUMARATE 200 MG/1
100 TABLET, FILM COATED ORAL AT BEDTIME
Refills: 0 | Status: DISCONTINUED | OUTPATIENT
Start: 2021-07-20 | End: 2021-07-21

## 2021-07-20 RX ADMIN — QUETIAPINE FUMARATE 100 MILLIGRAM(S): 200 TABLET, FILM COATED ORAL at 20:45

## 2021-07-20 RX ADMIN — FLUVOXAMINE MALEATE 200 MILLIGRAM(S): 25 TABLET ORAL at 08:33

## 2021-07-20 NOTE — BH INPATIENT PSYCHIATRY PROGRESS NOTE - OTHER
pt. reports addiction to pornography- has not watched pornography since March slightly superficial impaired but improving

## 2021-07-21 PROBLEM — F32.9 MAJOR DEPRESSIVE DISORDER, SINGLE EPISODE, UNSPECIFIED: Chronic | Status: ACTIVE | Noted: 2021-07-14

## 2021-07-21 PROCEDURE — 90834 PSYTX W PT 45 MINUTES: CPT

## 2021-07-21 PROCEDURE — 99232 SBSQ HOSP IP/OBS MODERATE 35: CPT

## 2021-07-21 RX ORDER — QUETIAPINE FUMARATE 200 MG/1
150 TABLET, FILM COATED ORAL AT BEDTIME
Refills: 0 | Status: DISCONTINUED | OUTPATIENT
Start: 2021-07-21 | End: 2021-07-22

## 2021-07-21 RX ADMIN — FLUVOXAMINE MALEATE 200 MILLIGRAM(S): 25 TABLET ORAL at 08:04

## 2021-07-21 RX ADMIN — QUETIAPINE FUMARATE 150 MILLIGRAM(S): 200 TABLET, FILM COATED ORAL at 21:23

## 2021-07-21 NOTE — BH INPATIENT PSYCHIATRY PROGRESS NOTE - OTHER
impaired but improving superficially bright, appears downcast pt. reports addiction to pornography- has not watched pornography since March

## 2021-07-22 LAB
ALBUMIN SERPL ELPH-MCNC: 4.5 G/DL — SIGNIFICANT CHANGE UP (ref 3.3–5)
ALP SERPL-CCNC: 53 U/L — SIGNIFICANT CHANGE UP (ref 40–120)
ALT FLD-CCNC: 21 U/L — SIGNIFICANT CHANGE UP (ref 4–41)
ANION GAP SERPL CALC-SCNC: 10 MMOL/L — SIGNIFICANT CHANGE UP (ref 7–14)
AST SERPL-CCNC: 18 U/L — SIGNIFICANT CHANGE UP (ref 4–40)
BILIRUB SERPL-MCNC: 0.7 MG/DL — SIGNIFICANT CHANGE UP (ref 0.2–1.2)
BUN SERPL-MCNC: 16 MG/DL — SIGNIFICANT CHANGE UP (ref 7–23)
CALCIUM SERPL-MCNC: 9.7 MG/DL — SIGNIFICANT CHANGE UP (ref 8.4–10.5)
CHLORIDE SERPL-SCNC: 103 MMOL/L — SIGNIFICANT CHANGE UP (ref 98–107)
CO2 SERPL-SCNC: 27 MMOL/L — SIGNIFICANT CHANGE UP (ref 22–31)
CREAT SERPL-MCNC: 1.46 MG/DL — HIGH (ref 0.5–1.3)
GLUCOSE SERPL-MCNC: 110 MG/DL — HIGH (ref 70–99)
POTASSIUM SERPL-MCNC: 4.8 MMOL/L — SIGNIFICANT CHANGE UP (ref 3.5–5.3)
POTASSIUM SERPL-SCNC: 4.8 MMOL/L — SIGNIFICANT CHANGE UP (ref 3.5–5.3)
PROT SERPL-MCNC: 7.2 G/DL — SIGNIFICANT CHANGE UP (ref 6–8.3)
SARS-COV-2 RNA SPEC QL NAA+PROBE: SIGNIFICANT CHANGE UP
SODIUM SERPL-SCNC: 140 MMOL/L — SIGNIFICANT CHANGE UP (ref 135–145)

## 2021-07-22 PROCEDURE — 99232 SBSQ HOSP IP/OBS MODERATE 35: CPT

## 2021-07-22 RX ORDER — QUETIAPINE FUMARATE 200 MG/1
200 TABLET, FILM COATED ORAL AT BEDTIME
Refills: 0 | Status: DISCONTINUED | OUTPATIENT
Start: 2021-07-22 | End: 2021-07-23

## 2021-07-22 RX ADMIN — FLUVOXAMINE MALEATE 200 MILLIGRAM(S): 25 TABLET ORAL at 08:11

## 2021-07-22 RX ADMIN — QUETIAPINE FUMARATE 200 MILLIGRAM(S): 200 TABLET, FILM COATED ORAL at 20:27

## 2021-07-22 NOTE — BH TREATMENT PLAN - NSTXDCOTHRGOAL_PSY_ALL_CORE
Patient will participate in treatment and discharge planning, report improved mood and coping skills, and with no suicidal IIP.

## 2021-07-22 NOTE — BH TREATMENT PLAN - NSBHPRIMARYDX_PSY_ALL_CORE
Severe episode of recurrent major depressive disorder, without psychotic features    
Severe episode of recurrent major depressive disorder, without psychotic features

## 2021-07-22 NOTE — BH TREATMENT PLAN - NSTXSUICIDGOAL_PSY_ALL_CORE
Talk to staff and ask for assistance when having suicidal wishes instead of acting out
Be able to state 3 reasons for living

## 2021-07-22 NOTE — BH INPATIENT PSYCHIATRY PROGRESS NOTE - OTHER
pt. reports addiction to pornography- has not watched pornography since March superficially bright and cooperative at times impaired but improving

## 2021-07-22 NOTE — BH TREATMENT PLAN - NSCMSPTSTRENGTHS_PSY_ALL_CORE
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

## 2021-07-22 NOTE — BH TREATMENT PLAN - NSTXSUICIDINTERPR_PSY_ALL_CORE
Pt's psychiatric rehabilitation goal is to identify two to three positive coping skills for increased symptom management.

## 2021-07-22 NOTE — BH TREATMENT PLAN - NSTXDCOTHRINTERSW_PSY_ALL_CORE
SW will provide support, psychoeducation, and discharge planning, while coordinating patient care with interdisciplinary treatment team and identified supports.

## 2021-07-23 PROCEDURE — 90834 PSYTX W PT 45 MINUTES: CPT | Mod: 59

## 2021-07-23 PROCEDURE — 99232 SBSQ HOSP IP/OBS MODERATE 35: CPT | Mod: 25

## 2021-07-23 PROCEDURE — 90853 GROUP PSYCHOTHERAPY: CPT

## 2021-07-23 RX ORDER — QUETIAPINE FUMARATE 200 MG/1
300 TABLET, FILM COATED ORAL AT BEDTIME
Refills: 0 | Status: DISCONTINUED | OUTPATIENT
Start: 2021-07-25 | End: 2021-07-26

## 2021-07-23 RX ORDER — FLUVOXAMINE MALEATE 25 MG/1
200 TABLET ORAL
Qty: 0 | Refills: 0 | DISCHARGE

## 2021-07-23 RX ORDER — QUETIAPINE FUMARATE 200 MG/1
1 TABLET, FILM COATED ORAL
Qty: 14 | Refills: 0
Start: 2021-07-23 | End: 2021-08-05

## 2021-07-23 RX ORDER — FLUVOXAMINE MALEATE 25 MG/1
2 TABLET ORAL
Qty: 28 | Refills: 0
Start: 2021-07-23 | End: 2021-08-05

## 2021-07-23 RX ORDER — LITHIUM CARBONATE 300 MG/1
0 TABLET, EXTENDED RELEASE ORAL
Qty: 0 | Refills: 0 | DISCHARGE

## 2021-07-23 RX ORDER — QUETIAPINE FUMARATE 200 MG/1
250 TABLET, FILM COATED ORAL AT BEDTIME
Refills: 0 | Status: DISCONTINUED | OUTPATIENT
Start: 2021-07-23 | End: 2021-07-23

## 2021-07-23 RX ORDER — QUETIAPINE FUMARATE 200 MG/1
250 TABLET, FILM COATED ORAL AT BEDTIME
Refills: 0 | Status: COMPLETED | OUTPATIENT
Start: 2021-07-23 | End: 2021-07-24

## 2021-07-23 RX ADMIN — QUETIAPINE FUMARATE 250 MILLIGRAM(S): 200 TABLET, FILM COATED ORAL at 21:05

## 2021-07-23 RX ADMIN — FLUVOXAMINE MALEATE 200 MILLIGRAM(S): 25 TABLET ORAL at 08:50

## 2021-07-23 NOTE — BH PSYCHOLOGY - GROUP THERAPY NOTE - NSPSYCHOLGRPCOGGOAL_PSY_A_CORE FT
Decrease symptoms, Develop coping/emotion regulation skills, Psychoeducation  

## 2021-07-23 NOTE — BH DISCHARGE NOTE NURSING/SOCIAL WORK/PSYCH REHAB - DISCHARGE INSTRUCTIONS AFTERCARE APPOINTMENTS
In order to check the location, date, or time of your aftercare appointment, please refer to your Discharge Instructions Document given to you upon leaving the hospital.  If you have lost the instructions please call [phone]

## 2021-07-23 NOTE — BH PSYCHOLOGY - GROUP THERAPY NOTE - NSPSYCHOLGRPCOGINT_PSY_A_CORE FT
Cognitive/behavioral therapy, Emotion regulation/coping skills taught, Psychoeducation  

## 2021-07-23 NOTE — BH PSYCHOLOGY - GROUP THERAPY NOTE - NSBHPSYCHOLPARTICIPCOMMENT_PSY_A_CORE FT
Actively and appropriately participated
Actively and appropriately participated
Actively and appropriately participated, pt shared that he wants to be more open with his wife regarding his emotions

## 2021-07-23 NOTE — BH DISCHARGE NOTE NURSING/SOCIAL WORK/PSYCH REHAB - NSCDUDCCRISIS_PSY_A_CORE
[facility]  1 (216) [facility]-WELL ([phone])  Text "WELL" to 69067  Website: www.[employer]/.[employer] 1 (112) 371-UZNT (6284) Website: www.safehorizon.org/.National Suicide Prevention Lifeline 4 (429) 345-7891/.  [employer]  1 (743) LIF[employer] (682-7522)/.  [facility]  [address] [place] [address] [phone]   Hours:  Monday through Friday from 9 AM to 3 PM/.  [employer] - Veterans Crisis Line  2 (904) [phone], Option 1

## 2021-07-23 NOTE — BH INPATIENT PSYCHIATRY PROGRESS NOTE - OTHER
impaired but improving pt. reports addiction to pornography- has not watched pornography since March

## 2021-07-23 NOTE — BH DISCHARGE NOTE NURSING/SOCIAL WORK/PSYCH REHAB - NSBHDCADDR1FT_A_CORE
Zoom and phone.  PAS will call the you a day prior or morning of appointment to complete registration. PHP will send an email to the you the evening before the appointment with the Zoom log-in information. Intake will last approximately 45 minutes and you can attend the 2 afternoon groups following intake. All subsequent days (M-F) 5 groups per day 9a-3p, 2 individual sessions per week, weekly medication management.

## 2021-07-23 NOTE — BH PSYCHOLOGY - GROUP THERAPY NOTE - NSPSYCHOLGRPCOGINT_PSY_A_CORE
24-May-2021 09:37
group members provided support/group members suggested positive behaviors/mindfulness skills taught/relaxation skills practiced/other..

## 2021-07-23 NOTE — BH PSYCHOLOGY - GROUP THERAPY NOTE - NSPSYCHOLGRPCOGPROB_PSY_A_CORE FT
Anxiety, Depression, Emotion dysregulation, Lack of coping skills  

## 2021-07-23 NOTE — BH DISCHARGE NOTE NURSING/SOCIAL WORK/PSYCH REHAB - NSDCPRGOAL_PSY_ALL_CORE
Pt made progress towards his discharge. Pt has identified his coping skills such as listen to music, doing something in garage, go for a drive, and talk to his support system to manage symptoms. Pt stated no more depression and tremendously decreased anxiety as his symptom improvement. Pt has verbalized the importance of reach out to his support system and utilize his coping skills to manage his anxiety and depression. Pt currently denies SI, HI, AH, and VH.     During this hospitalization, pt attended all group. During the group session, pt was able to tolerate group structure, attentive, actively participated with relevant feedback. Pt was visible in the day room, showed good interactions with peers. Pt maintained fair ADLS.   Pt has participated in his safety plan. Pt was provided with press [employer] survey.

## 2021-07-23 NOTE — BH DISCHARGE NOTE NURSING/SOCIAL WORK/PSYCH REHAB - PATIENT PORTAL LINK FT
You can access the FollowMyHealth Patient Portal offered by [facility] by registering at the following website: [email or web address]. By joining [employer]’s FollowMyHealth portal, you will also be able to view your health information using other applications (apps) compatible with our system.

## 2021-07-23 NOTE — BH PSYCHOLOGY - GROUP THERAPY NOTE - NSPSYCHOLGRPCOGPT_PSY_A_CORE
participated in exercise/shared negative coping experience/shared positive coping experience/other...

## 2021-07-23 NOTE — BH PSYCHOLOGY - GROUP THERAPY NOTE - NSPSYCHOLGRPCOGPT_PSY_A_CORE FT
Patient attended Cognitive Behavioral Therapy Group. The group started with a brief check in and mindfulness /relaxation exercise. The group then focused on the topic of distress tolerance and self care. Patients discussed the concept of distress tolerance and shared examples of healthy ways to cope with distress and ways to engage in healthy self-care (balanced sleep, healthy eating etc). The  explained concepts, reinforced participation, and engaged patients in the discussion.  The group concluded with a checkout and discussion of daily goals.  

## 2021-07-24 RX ADMIN — QUETIAPINE FUMARATE 250 MILLIGRAM(S): 200 TABLET, FILM COATED ORAL at 21:02

## 2021-07-24 RX ADMIN — FLUVOXAMINE MALEATE 200 MILLIGRAM(S): 25 TABLET ORAL at 08:52

## 2021-07-25 RX ADMIN — FLUVOXAMINE MALEATE 200 MILLIGRAM(S): 25 TABLET ORAL at 08:38

## 2021-07-25 RX ADMIN — QUETIAPINE FUMARATE 300 MILLIGRAM(S): 200 TABLET, FILM COATED ORAL at 20:29

## 2021-07-26 VITALS — HEART RATE: 77 BPM | SYSTOLIC BLOOD PRESSURE: 116 MMHG | DIASTOLIC BLOOD PRESSURE: 77 MMHG | TEMPERATURE: 98 F

## 2021-07-26 DIAGNOSIS — F33.3 MAJOR DEPRESSIVE DISORDER, RECURRENT, SEVERE WITH PSYCHOTIC SYMPTOMS: ICD-10-CM

## 2021-07-26 DIAGNOSIS — F32.3 MAJOR DEPRESSIVE DISORDER, SINGLE EPISODE, SEVERE WITH PSYCHOTIC FEATURES: ICD-10-CM

## 2021-07-26 PROCEDURE — 99239 HOSP IP/OBS DSCHRG MGMT >30: CPT

## 2021-07-26 RX ADMIN — FLUVOXAMINE MALEATE 200 MILLIGRAM(S): 25 TABLET ORAL at 08:38

## 2021-07-26 NOTE — BH INPATIENT PSYCHIATRY PROGRESS NOTE - NSTXSUICIDDATETRGT_PSY_ALL_CORE
28-Jul-2021
23-Jul-2021
23-Jul-2021
28-Jul-2021
23-Jul-2021
30-Jul-2021
26-Jul-2021
23-Jul-2021
23-Jul-2021

## 2021-07-26 NOTE — BH INPATIENT PSYCHIATRY DISCHARGE NOTE - NSBHMETABOLIC_PSY_ALL_CORE_FT
BMI: BMI (kg/m2): 25.4 (07-15-21 @ 14:25)  HbA1c: A1C with Estimated Average Glucose Result: 5.5 % (07-16-21 @ 10:53)    Glucose:   BP: 116/77 (07-26-21 @ 08:19) (116/77 - 116/77)  Lipid Panel: Date/Time: 07-16-21 @ 10:53  Cholesterol, Serum: 176  Direct LDL: --  HDL Cholesterol, Serum: 49  Total Cholesterol/HDL Ration Measurement: --  Triglycerides, Serum: 147

## 2021-07-26 NOTE — BH INPATIENT PSYCHIATRY PROGRESS NOTE - NSBHANTIPSYCHOTIC_PSY_ALL_CORE_FT
Repeat TSH and free thyroxine WNL; Cr elevated at 1.38

## 2021-07-26 NOTE — BH INPATIENT PSYCHIATRY DISCHARGE NOTE - MODIFICATIONS
Patient reports euthymic mood today.  denies si/hi with no I/i/p.  denies passive suicidal ideation.  no self injurious behavior.  reports fair sleep and appetite.  he denies worthlessness or hopelessness.  Is future orientated, looking forward to partial and spending time with family.   he denies darwin or psychosis

## 2021-07-26 NOTE — BH INPATIENT PSYCHIATRY DISCHARGE NOTE - NSBHDCRISKMITIGATE_PSY_ALL_CORE
Safety planning/Referral to PHP/Family/Other social support involvement/Medications targeting suicidality/non-suicidal self injurious behavior

## 2021-07-26 NOTE — BH INPATIENT PSYCHIATRY DISCHARGE NOTE - NSBHSUICIDESTATUS_PSY_ALL_CORE
Pt. denies SI/i/p. Risk factors include Hx of SI and recent SA, recent hospitalization, male gender, mood disorder, personality disorder traits, trauma.

## 2021-07-26 NOTE — BH INPATIENT PSYCHIATRY PROGRESS NOTE - NSBHMSEKNOWHOW_PSY_ALL_CORE
Educational attainment

## 2021-07-26 NOTE — BH INPATIENT PSYCHIATRY PROGRESS NOTE - NSBHCONSULTIPREASON_PSY_A_CORE
danger to self; mental illness expected to respond to inpatient care
other reason
danger to self; mental illness expected to respond to inpatient care

## 2021-07-26 NOTE — BH INPATIENT PSYCHIATRY PROGRESS NOTE - NSTXDCOTHRDATETRGT_PSY_ALL_CORE
26-Jul-2021
28-Jul-2021
26-Jul-2021
28-Jul-2021

## 2021-07-26 NOTE — BH INPATIENT PSYCHIATRY PROGRESS NOTE - NSBHINPTBILLING_PSY_ALL_CORE
17049 - Inpatient Low Complexity
33106 - Inpatient Moderate Complexity
57785 - Hospital Discharge Day Management; more than 30 min
82281 - Inpatient Low Complexity
58035 - Inpatient Moderate Complexity
90899 - Inpatient Moderate Complexity
15025 - Inpatient Moderate Complexity
64725 - Inpatient Moderate Complexity
35019 - Inpatient Moderate Complexity

## 2021-07-26 NOTE — BH INPATIENT PSYCHIATRY PROGRESS NOTE - NSBHCHARTREVIEWVS_PSY_A_CORE FT
Vital Signs Last 24 Hrs  T(C): 36.6 (18 Jul 2021 06:28), Max: 36.8 (17 Jul 2021 14:45)  T(F): 97.9 (18 Jul 2021 06:28), Max: 98.2 (17 Jul 2021 14:45)  Orthostatic VS    07-18-21 @ 08:15  Lying BP: --/-- HR: --   Sitting BP: 137/90 HR: 73  Standing BP: 132/91 HR: 76  Site: upper left arm   Mode: electronic    07-17-21 @ 19:17  Lying BP: --/-- HR: --   Sitting BP: 121/85 HR: 72  Standing BP: 118/77 HR: 73  Site: --   Mode: --    07-17-21 @ 08:10  Lying BP: --/-- HR: --   Sitting BP: 130/83 HR: 73  Standing BP: 120/87 HR: 86  Site: upper left arm   Mode: electronic    07-16-21 @ 19:22  Lying BP: --/-- HR: --   Sitting BP: 130/77 HR: 73  Standing BP: 125/80 HR: 80  Site: --   Mode: --  
Vital Signs Last 24 Hrs  T(C): 36.6 (20 Jul 2021 06:12), Max: 36.8 (19 Jul 2021 14:11)  T(F): 97.8 (20 Jul 2021 06:12), Max: 98.3 (19 Jul 2021 14:11)  HR: --69  BP: --133/86  BP(mean): --  RR: 16 (19 Jul 2021 21:40) (16 - 16)  SpO2: 99% (19 Jul 2021 21:40) (99% - 99%)
Vital Signs Last 24 Hrs  T(C): 36.7 (23 Jul 2021 06:23), Max: 36.7 (22 Jul 2021 19:09)  T(F): 98 (23 Jul 2021 06:23), Max: 98.1 (22 Jul 2021 19:09)  HR: --98  BP: --116/77  BP(mean): --  RR: --  SpO2: --
Vital Signs Last 24 Hrs  T(C): 36.7 (17 Jul 2021 06:06), Max: 36.7 (17 Jul 2021 06:06)  T(F): 98.1 (17 Jul 2021 06:06), Max: 98.1 (17 Jul 2021 06:06)  Orthostatic VS    07-17-21 @ 08:10  Lying BP: --/-- HR: --   Sitting BP: 130/83 HR: 73  Standing BP: 120/87 HR: 86  Site: upper left arm   Mode: electronic    07-16-21 @ 19:22  Lying BP: --/-- HR: --   Sitting BP: 130/77 HR: 73  Standing BP: 125/80 HR: 80  Site: --   Mode: --    07-16-21 @ 08:01  Lying BP: --/-- HR: --   Sitting BP: 112/76 HR: 70  Standing BP: 104/75 HR: 82  Site: upper left arm   Mode: electronic    07-15-21 @ 19:13  Lying BP: --/-- HR: --   Sitting BP: 118/78 HR: 79  Standing BP: 104/76 HR: 89  Site: --   Mode: --    07-15-21 @ 14:25  Lying BP: --/-- HR: --   Sitting BP: 115/80 HR: 84  Standing BP: 122/83 HR: 73  Site: --   Mode: --  
Vital Signs Last 24 Hrs  T(C): 36.8 (26 Jul 2021 08:19), Max: 37.2 (26 Jul 2021 05:58)  T(F): 98.3 (26 Jul 2021 08:19), Max: 98.9 (26 Jul 2021 05:58)  HR: 77 (26 Jul 2021 08:19) (77 - 77)  BP: 116/77 (26 Jul 2021 08:19) (116/77 - 116/77)  BP(mean): --  RR: 18 (25 Jul 2021 21:35) (18 - 18)  SpO2: 100% (25 Jul 2021 21:35) (100% - 100%)
Vital Signs Last 24 Hrs  T(C): 36.9 (21 Jul 2021 06:35), Max: 37.2 (20 Jul 2021 18:51)  T(F): 98.5 (21 Jul 2021 06:35), Max: 98.9 (20 Jul 2021 18:51)  HR: --85  BP: 108/76 (21 Jul 2021 07:33) (108/76 - 108/76)  BP(mean):  RR: --  SpO2: --
Vital Signs Last 24 Hrs  T(C): 36.5 (16 Jul 2021 14:42), Max: 37.1 (16 Jul 2021 06:18)  T(F): 97.7 (16 Jul 2021 14:42), Max: 98.7 (16 Jul 2021 06:18)  HR: --70  BP: --112/76  BP(mean): --  RR: --  SpO2: --
Vital Signs Last 24 Hrs  T(C): 35.9 (22 Jul 2021 14:18), Max: 36.7 (21 Jul 2021 18:46)  T(F): 96.7 (22 Jul 2021 14:18), Max: 98 (21 Jul 2021 18:46)  HR: --89  BP: --126/75  BP(mean): --  RR: 18 (21 Jul 2021 18:46) (18 - 18)  SpO2: 98% (21 Jul 2021 18:46) (98% - 98%)
Vital Signs Last 24 Hrs  T(C): 36.8 (19 Jul 2021 14:11), Max: 36.9 (19 Jul 2021 06:24)  T(F): 98.3 (19 Jul 2021 14:11), Max: 98.5 (19 Jul 2021 06:24)  HR: --86  BP: 103/76 (19 Jul 2021 07:47) (103/76 - 103/76)  BP(mean):   RR: 18 (19 Jul 2021 09:20) (16 - 18)  SpO2: 100% (18 Jul 2021 21:58) (100% - 100%)

## 2021-07-26 NOTE — BH INPATIENT PSYCHIATRY PROGRESS NOTE - NSBHMSEBEHAV_PSY_A_CORE
Cooperative/Other
Cooperative
Cooperative/Other
Cooperative
Cooperative
Cooperative/Other
Cooperative/Other

## 2021-07-26 NOTE — BH INPATIENT PSYCHIATRY DISCHARGE NOTE - NSDCCPCAREPLAN_GEN_ALL_CORE_FT
PRINCIPAL DISCHARGE DIAGNOSIS  Diagnosis: MDD (major depressive disorder)  Assessment and Plan of Treatment:        PRINCIPAL DISCHARGE DIAGNOSIS  Diagnosis: Severe episode of recurrent major depressive disorder, with psychotic features  Assessment and Plan of Treatment:        PRINCIPAL DISCHARGE DIAGNOSIS  Diagnosis: Severe single current episode of major depressive disorder, with psychotic features  Assessment and Plan of Treatment:

## 2021-07-26 NOTE — BH INPATIENT PSYCHIATRY PROGRESS NOTE - NSDCCRITERIA_PSY_ALL_CORE
Sx reduction/remission, decreased risk of harm

## 2021-07-26 NOTE — BH INPATIENT PSYCHIATRY DISCHARGE NOTE - CASE SUMMARY
Pt is a 62 y/o male domiciled, employed as a  (currently on medical leave), with PPH of MDD, 2 past inpatient hospitalization, most recent at [facility] in [place] (d/c today 7/14/21), reports of  2 past aborted SA (contemplating shooting himself with a shot gun and on 6/30/21, attempted via carbon monoxide poisoning) currently in outpatient treatment with Dr Luke Brown, prescribed Luvox 200 mg and lithium 600 mg daily, no reported hx of violence/aggression/legal issues, denies past and current substance use, social Etoh use, no PMH, today presented to the ED after being d/c from inpatient unit in [place] for worsening suicidal thoughts.  The patient's depressive symptoms have improved significantly. Patient was visible on the unit, socialized with select peers and attended groups with good participation.  Patient was not disruptive to the unit and remained in fair behavioral control.  Patient denied any suicidal or homicidal ideations leading up to discharge.   Patient was provided with extensive psychoeducation regarding importance of compliance with medications.  Patient is not judged to be an imminent danger to self or others at this time. Patient's family agreed with discharge plan and felt that it was safe for patient to return home.   Patient and family were provided with emergency phone numbers and were instructed to call 911 or go to the nearest ER for worsening of symptoms, dangerousness to self/others. Patient denies any suicidal or homicidal ideation, intent and plan at the time of discharge. Patient is able to care for self.  There is no evidence of florid psychosis or darwin or exam. Patient does not pose imminent danger to self or others, stable for discharge home to family.  Prognosis is guarded.   Patient will be discharged today to home and outpatient follow-up at [facility] program to address depression.

## 2021-07-26 NOTE — BH INPATIENT PSYCHIATRY DISCHARGE NOTE - NSDCRECOMMENDMEDICALFT_PSY_ALL_CORE
Subjective:       Patient ID: Houston Jc is a 72 y.o. male.    Chief Complaint: Urinary Retention    Houston Jc is a 72 y.o. Male new to Urology.  He is here today for evaluation of difficulty urinating  He was recently seen by his cardiologist and it was reported that he was not urinating after     He received a dual chamber ICD ([employer] [facility]) on 7/18/19. During the admission he had a post-CHF ATN vs CKD and was discharged with nephrology and cardiology follow-up.     As an outpatient he was doing well until the last few days when he noticed a 5-lb weight hieu.   Since his discharge the weight gain is actually 14 lbs, and from his dry weight (309 lbs at home) he is up 35lbs.   He has been poorly compliant with fluid restriction.   He has a weak stream and is not peeing within 1 hour of taking his diuretic, suggesting that it is not working well.     He was started on Flomax and Finasteride on 08/07/2019.       Today he joined with his wife.  He reports that he has noticed an improvement with his urine flow  He comes better and less dribbling afterwards.  He urinates a lot but now taking diuretics twice a day.        His wife reports he was supposed to have a WALKER ordered by his cardiologist but has not received or heard anything about it.              Past Medical History:  No date: CHF (congestive heart failure)  No date: Coronary artery disease  No date: Diabetes mellitus  No date: Hypertension    Past Surgical History:  7/18/2019: INSERTION, ICD, BIVENTRICULAR; Left      Comment:  Performed by Arturo Bay MD at [facility] EP LAB  7/9/2019: Insertion, Pacemaker, Temporary Transvenous; N/A      Comment:  Performed by Dejuan Cody MD at [facility] CATH LAB/EP  7/16/2019: Left heart cath; N/A      Comment:  Performed by Dejuan Cody MD at [facility] CATH LAB/EP    Review of patient's family history indicates:  Problem: Heart attack      Relation: Father          Age of Onset: (Not Specified)      Social History     Socioeconomic History      Marital status:       Spouse name: Not on file      Number of children: Not on file      Years of education: Not on file      Highest education level: Not on file    Occupational History      Not on file    Social Needs      Financial resource strain: Not on file      Food insecurity:        Worry: Not on file        Inability: Not on file      Transportation needs:        Medical: Not on file        Non-medical: Not on file    Tobacco Use      Smoking status: Former Smoker      Smokeless tobacco: Never Used    Substance and Sexual Activity      Alcohol use: Yes        Comment: social      Drug use: No      Sexual activity: Not on file    Lifestyle      Physical activity:        Days per week: Not on file        Minutes per session: Not on file      Stress: Not on file    Relationships      Social connections:        Talks on phone: Not on file        Gets together: Not on file        Attends Buddhism service: Not on file        Active member of club or organization: Not on file        Attends meetings of clubs or organizations: Not on file        Relationship status: Not on file    Other Topics      Concerns:        Not on file    Social History Narrative      Not on file      Allergies:  Patient has no known allergies.    Medications:  Current Outpatient Medications:   allopurinol (ZYLOPRIM) 300 MG tablet, Take 300 mg by mouth once daily., Disp: , Rfl:   alogliptin benzoate (ALOGLIPTIN ORAL), Take 25 mg by mouth once daily., Disp: , Rfl:   amiodarone (PACERONE) 200 MG Tab, Take 2 tablet (400 mg total) by mouth 2 (two) times daily for 10 days. Afterwards take 2 tablet by mouth (400mg total) until you follow up with your cardiologist., Disp: 80 tablet, Rfl: 1  aspirin (ECOTRIN) 81 MG EC tablet, Take 1 tablet (81 mg total) by mouth once daily., Disp: , Rfl: 0  atorvastatin (LIPITOR) 80 MG tablet, Take 1 tablet (80 mg total) by mouth once daily., Disp: 90 tablet, Rfl: 3   "carvedilol (COREG) 12.5 MG tablet, Take 1 tablet (12.5 mg total) by mouth 2 (two) times daily., Disp: 60 tablet, Rfl: 11  ergocalciferol (ERGOCALCIFEROL) 50,000 unit Cap, Take 50,000 Units by mouth every 7 days., Disp: , Rfl:   finasteride (PROSCAR) 5 mg tablet, Take 1 tablet (5 mg total) by mouth once daily., Disp: 30 tablet, Rfl: 11  insulin NPH-insulin regular, 70/30, (NOVOLIN 70/30) 100 unit/mL (70-30) injection, Inject into the skin 3 (three) times daily., Disp: , Rfl:   losartan (COZAAR) 25 MG tablet, Take 1 tablet (25 mg total) by mouth once daily., Disp: 90 tablet, Rfl: 3  magnesium oxide (MAG-OXIDE ORAL), Take 420 mg by mouth., Disp: , Rfl:   sildenafil (VIAGRA) 100 MG tablet, Take 100 mg by mouth once a week., Disp: , Rfl:   spironolactone (ALDACTONE) 12.5 MG tablet, Take 12.5 mg by mouth once daily., Disp: , Rfl:   tamsulosin (FLOMAX) 0.4 mg Cap, Take 1 capsule (0.4 mg total) by mouth once daily., Disp: 30 capsule, Rfl: 11  torsemide (DEMADEX) 20 MG Tab, Take 2 tablets (40 mg total) by mouth 2 (two) times daily., Disp: 120 tablet, Rfl: 11  VENTOLIN HFA 90 mcg/actuation inhaler, Inhale 1 puff into the lungs every 4 (four) hours as needed. , Disp: , Rfl:   walker Misc, 5" wheel rolling walker, Disp: 1 each, Rfl: 1                    Review of Systems   Constitutional: Negative for activity change, appetite change, chills and fever.   HENT: Negative for facial swelling and trouble swallowing.    Eyes: Negative for visual disturbance.   Respiratory: Positive for shortness of breath. Negative for chest tightness.         SOB with exertion.     Cardiovascular: Positive for leg swelling. Negative for chest pain and palpitations.   Gastrointestinal: Negative.  Negative for abdominal pain, constipation, diarrhea, nausea and vomiting.   Genitourinary: Positive for frequency, nocturia and urgency. Negative for difficulty urinating, dysuria, flank pain, hematuria, penile pain, penile swelling, scrotal " swelling and testicular pain.        FOS has improved.  The frequency/nocturia from his fluid pills      Musculoskeletal: Positive for gait problem. Negative for back pain, myalgias and neck stiffness.   Skin: Negative for rash.   Neurological: Positive for weakness. Negative for dizziness and speech difficulty.   Hematological: Does not bruise/bleed easily.   Psychiatric/Behavioral: Negative for behavioral problems.       Objective:      Physical Exam   Nursing note and vitals reviewed.  Constitutional: He is oriented to person, place, and time. Vital signs are normal. He appears well-developed and well-nourished. He is active and cooperative.  Non-toxic appearance. He does not have a sickly appearance.   His urine dipped clear of infection and the PVR done by the nurse in the office today immediately after urination was 12.       HENT:   Head: Normocephalic and atraumatic.   Right Ear: External ear normal.   Left Ear: External ear normal.   Nose: Nose normal.   Mouth/Throat: Mucous membranes are normal.   Eyes: Conjunctivae and lids are normal. No scleral icterus.   Neck: Trachea normal, normal range of motion and full passive range of motion without pain. Neck supple. No JVD present. No tracheal deviation present.   Cardiovascular: Normal rate, S1 normal and S2 normal.    Pulmonary/Chest: Effort normal. No respiratory distress. He exhibits no tenderness.   Abdominal: Soft. Normal appearance and bowel sounds are normal. There is no hepatosplenomegaly. There is no tenderness. There is no CVA tenderness.   Musculoskeletal: Normal range of motion. He exhibits edema.        Right ankle: He exhibits swelling.        Left ankle: He exhibits swelling.        Right lower leg: He exhibits edema.        Left lower leg: He exhibits edema.        Right foot: There is swelling.        Left foot: There is swelling.   Neurological: He is alert and oriented to person, place, and time. He has normal strength.   Skin: Skin is warm,  dry and intact.     Psychiatric: He has a normal mood and affect. His behavior is normal. Judgment and thought content normal.       Assessment:       1. BPH with urinary obstruction    2. Difficulty urinating    3. Urinary frequency    4. Nocturia    5. Swelling of both lower extremities        Plan:         I spent 30 minutes with the patient of which more than half was spent in direct consultation with the patient in regards to our treatment and plan.    Education and recommendations of today's plan of care including home remedies.  We discussed this office findings; reassurance that he is not in retention; FOS has improved with the flomax and it will continue to get better.  Finasteride takes 6 months to get top results.  We discussed elevating his lower extremities 2 hours prior to bed; help reduce nocturia;  Discussed how when in bed fluid from legs gets dumped into kidneys.  Diet modifications; fluid/bladder restrictions based on Cardiology.  He voiced understanding and approval.  Will see him back in 6 months/psa and exam  Will check on his walker for him.     Follow-up with primary care for repeat labwork   Follow-up with partial hospitalization appointment

## 2021-07-26 NOTE — BH INPATIENT PSYCHIATRY PROGRESS NOTE - NSTXPROBSUICID_PSY_ALL_CORE
SUICIDE/SELF-INJURIOUS BEHAVIOR

## 2021-07-26 NOTE — BH INPATIENT PSYCHIATRY PROGRESS NOTE - NSTXDCOTHRDATEEST_PSY_ALL_CORE
16-Jul-2021

## 2021-07-26 NOTE — BH PSYCHOLOGY - CLINICIAN PSYCHOTHERAPY NOTE - NSBHPSYCHOLNARRATIVE_PSY_A_CORE FT
Writer and PT wore masks due to COVID19 precautions. Pt was adequately groomed, casually dressed. Reported mood as anxious, mood congruent affect demonstrated. Thought process linear, speech wnl. Pt denied suicidal ideation, plan or intent. Pt did not endorse HI. Oriented x3. No psychotic sx noted. Fiar insight demonstrated.    Pt discussed feeling depressed since March 2021 when his wife learned that he had been watching porn again. Pt reported he watched approx. 3 -4x a week for 20 min. Pt stated he does not like that he watches it and wants to stop for himself and because it bothers his wife. He stated his wife feels he is dishonest since he did not disclose that he was watching porn. Pt stated he wife discussed possibly  and the thought of his marriage ending made him feel suicidal. Pt stated he does not have active SI and is glad he did not go through with a suicide attempt. Explored what relationship with wife means to the patient and ways the concept of losing the relationship contributed to his depression. Pt stated he hopes to return to counseling with wife. Provided empathy and support to patient. Discussed ACT concept of experiential avoidance and the cost of pt trying to get rid of his emotions.
Writer and PT wore masks due to COVID19 precautions. Pt was adequately groomed, casually dressed. Reported mood as improved, mood congruent affect demonstrated. Thought process linear, speech wnl. Pt denied suicidal ideation, plan or intent. Pt did not endorse HI. Oriented x3. No psychotic sx noted. Fair insight demonstrated.    Pt was discharged focused and stated that he wants to go on a planned trip with his wife this weekend to reconnect with her. Pt reported he has been feeling better about their interactions and that they are committed to each other. Pt discussed the history of their relationship and the ways he wants to improve communication with her. Helped pt to explore his feelings and thoughts. Pt denied SI. He shared that a large part of his "running away" and suicidal behavior was so that his wife would ask him to return. Provided empathy and support.
Thought process linear, speech wnl. Pt denied suicidal ideation, plan or intent. Pt did not endorse HI. Oriented x3. No psychotic sx noted. Fair insight demonstrated.    Pt discussed feelings related to discharge today. Helped pt explore thoughts and feelings. Client reported feeling supported by friends and family and looking forward to seeing them today. Discussed ways pt can cope with ups and downs in his marriage instead of running away. Provided empathy and support.
Writer and PT wore masks due to COVID19 precautions. Pt was adequately groomed, casually dressed. Reported mood as improved, mood congruent affect demonstrated. Thought process linear, speech wnl. Pt denied suicidal ideation, plan or intent. Pt did not endorse HI. Oriented x3. No psychotic sx noted. Fair insight demonstrated.    Pt reported he was disappointed not to be discharged today but understands the reasons. Pt discussed looking forward to the partial program and thinking it is a good plan for his recovery. Pt discussed ongoing communication he has had with his wife and ways he wants to let her know his needs more often. Pt stated wife has been receptive to these conversations. Pt discussed trajectory of their relationships and stressed of raising 5 children. Helped pt explore thoughts and feelings. Provided empathy and support.

## 2021-07-26 NOTE — BH INPATIENT PSYCHIATRY PROGRESS NOTE - NSBHMSETHTCONTENT_PSY_A_CORE
Preoccupations/Other
Unremarkable/Preoccupations/Other
Unremarkable/Other
Preoccupations/Other
Unremarkable/Other
Unremarkable/Other
Unremarkable/Preoccupations/Other

## 2021-07-26 NOTE — BH INPATIENT PSYCHIATRY PROGRESS NOTE - PRN MEDS
MEDICATIONS  (PRN):  acetaminophen   Tablet .. 650 milliGRAM(s) Oral every 6 hours PRN Temp greater or equal to 38C (100.4F), Moderate Pain (4 - 6), Severe Pain (7 - 10)  hydrOXYzine hydrochloride 25 milliGRAM(s) Oral every 6 hours PRN Anxiety  LORazepam     Tablet 2 milliGRAM(s) Oral every 6 hours PRN severe anxiety  LORazepam   Injectable 2 milliGRAM(s) IntraMuscular once PRN severe anxiety  
MEDICATIONS  (PRN):  acetaminophen   Tablet .. 650 milliGRAM(s) Oral every 6 hours PRN Temp greater or equal to 38C (100.4F), Moderate Pain (4 - 6), Severe Pain (7 - 10)  hydrOXYzine hydrochloride 50 milliGRAM(s) Oral every 6 hours PRN anxiety/insomnia  LORazepam     Tablet 2 milliGRAM(s) Oral every 6 hours PRN severe anxiety  LORazepam   Injectable 2 milliGRAM(s) IntraMuscular once PRN severe anxiety  
MEDICATIONS  (PRN):  hydrOXYzine hydrochloride 25 milliGRAM(s) Oral every 6 hours PRN Anxiety  LORazepam     Tablet 2 milliGRAM(s) Oral every 6 hours PRN severe anxiety  LORazepam   Injectable 2 milliGRAM(s) IntraMuscular once PRN severe anxiety  
MEDICATIONS  (PRN):  acetaminophen   Tablet .. 650 milliGRAM(s) Oral every 6 hours PRN Temp greater or equal to 38C (100.4F), Moderate Pain (4 - 6), Severe Pain (7 - 10)  hydrOXYzine hydrochloride 50 milliGRAM(s) Oral every 6 hours PRN anxiety/insomnia  LORazepam     Tablet 2 milliGRAM(s) Oral every 6 hours PRN severe anxiety  LORazepam   Injectable 2 milliGRAM(s) IntraMuscular once PRN severe anxiety  
MEDICATIONS  (PRN):  acetaminophen   Tablet .. 650 milliGRAM(s) Oral every 6 hours PRN Temp greater or equal to 38C (100.4F), Moderate Pain (4 - 6), Severe Pain (7 - 10)  hydrOXYzine hydrochloride 50 milliGRAM(s) Oral every 6 hours PRN anxiety/insomnia  LORazepam     Tablet 2 milliGRAM(s) Oral every 6 hours PRN severe anxiety  LORazepam   Injectable 2 milliGRAM(s) IntraMuscular once PRN severe anxiety  
MEDICATIONS  (PRN):  hydrOXYzine hydrochloride 25 milliGRAM(s) Oral every 6 hours PRN Anxiety  LORazepam     Tablet 2 milliGRAM(s) Oral every 6 hours PRN severe anxiety  LORazepam   Injectable 2 milliGRAM(s) IntraMuscular once PRN severe anxiety  
MEDICATIONS  (PRN):  acetaminophen   Tablet .. 650 milliGRAM(s) Oral every 6 hours PRN Temp greater or equal to 38C (100.4F), Moderate Pain (4 - 6), Severe Pain (7 - 10)  hydrOXYzine hydrochloride 50 milliGRAM(s) Oral every 6 hours PRN anxiety/insomnia  LORazepam     Tablet 2 milliGRAM(s) Oral every 6 hours PRN severe anxiety  LORazepam   Injectable 2 milliGRAM(s) IntraMuscular once PRN severe anxiety  
MEDICATIONS  (PRN):  acetaminophen   Tablet .. 650 milliGRAM(s) Oral every 6 hours PRN Temp greater or equal to 38C (100.4F), Moderate Pain (4 - 6), Severe Pain (7 - 10)  hydrOXYzine hydrochloride 50 milliGRAM(s) Oral every 6 hours PRN anxiety/insomnia  LORazepam     Tablet 2 milliGRAM(s) Oral every 6 hours PRN severe anxiety  LORazepam   Injectable 2 milliGRAM(s) IntraMuscular once PRN severe anxiety  
MEDICATIONS  (PRN):  hydrOXYzine hydrochloride 25 milliGRAM(s) Oral every 6 hours PRN Anxiety  LORazepam     Tablet 2 milliGRAM(s) Oral every 6 hours PRN severe anxiety  LORazepam   Injectable 2 milliGRAM(s) IntraMuscular once PRN severe anxiety

## 2021-07-26 NOTE — BH INPATIENT PSYCHIATRY DISCHARGE NOTE - NSDCPROCEDURESFT_PSY_ALL_CORE
Repeat TSH on 7/19: 2.24- normal  Repeat free thyroxine on 7/19: 1.2- normal  Repeat Cr on 7/22: 1.46- follow-up with primary care as this level is slightly elevated

## 2021-07-26 NOTE — BH INPATIENT PSYCHIATRY PROGRESS NOTE - NSBHPROGMEDSE_PSY_A_CORE FT
elevated CR- lithium discontinued
elevated CR- lithium discontinued  jittery on Abilify-discontinued
elevated CR- lithium discontinued
elevated CR- lithium discontinued
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
## 2021-07-26 NOTE — BH INPATIENT PSYCHIATRY DISCHARGE NOTE - NSBHDCMEDICALFT_PSY_A_CORE
TSH was elevated upon admission; repeat TSH and free thyroxine are WNL  Creatinine remained slightly elevated for duration of hospitalization; last result on 7/22: 1.46; lithium was discontinued. Patient advised to hydrate and follow-up with PCP

## 2021-07-26 NOTE — BH PSYCHOLOGY - CLINICIAN PSYCHOTHERAPY NOTE - NSBHPSYCHOLINT_PSY_A_CORE
Cognitive/behavioral therapy/Supported coping skills/Supportive therapy/other...

## 2021-07-26 NOTE — BH INPATIENT PSYCHIATRY PROGRESS NOTE - NSICDXBHPRIMARYDX_PSY_ALL_CORE
Severe episode of recurrent major depressive disorder, without psychotic features   F33.2  

## 2021-07-26 NOTE — BH PSYCHOLOGY - CLINICIAN PSYCHOTHERAPY NOTE - NSBHPSYCHOLASSESSPROV_PSY_A_CORE
Licensed Staff Psychologist

## 2021-07-26 NOTE — BH INPATIENT PSYCHIATRY PROGRESS NOTE - NSBHMETABOLIC_PSY_ALL_CORE_FT
BMI: BMI (kg/m2): 25.4 (07-15-21 @ 14:25)  HbA1c: A1C with Estimated Average Glucose Result: 5.5 % (07-16-21 @ 10:53)    Glucose:   BP: 124/82 (07-15-21 @ 12:39) (111/69 - 130/86)  Lipid Panel: Date/Time: 07-16-21 @ 10:53  Cholesterol, Serum: 176  Direct LDL: --  HDL Cholesterol, Serum: 49  Total Cholesterol/HDL Ration Measurement: --  Triglycerides, Serum: 147  
BMI: BMI (kg/m2): 25.4 (07-15-21 @ 14:25)  HbA1c: A1C with Estimated Average Glucose Result: 5.5 % (07-16-21 @ 10:53)    Glucose:   BP: 103/76 (07-19-21 @ 07:47) (103/76 - 103/76)  Lipid Panel: Date/Time: 07-16-21 @ 10:53  Cholesterol, Serum: 176  Direct LDL: --  HDL Cholesterol, Serum: 49  Total Cholesterol/HDL Ration Measurement: --  Triglycerides, Serum: 147  
BMI: BMI (kg/m2): 25.4 (07-15-21 @ 14:25)  HbA1c: A1C with Estimated Average Glucose Result: 5.5 % (07-16-21 @ 10:53)    Glucose:   BP: 108/76 (07-21-21 @ 07:33) (103/76 - 108/76)  Lipid Panel: Date/Time: 07-16-21 @ 10:53  Cholesterol, Serum: 176  Direct LDL: --  HDL Cholesterol, Serum: 49  Total Cholesterol/HDL Ration Measurement: --  Triglycerides, Serum: 147  
BMI: BMI (kg/m2): 25.4 (07-15-21 @ 14:25)  HbA1c: A1C with Estimated Average Glucose Result: 5.5 % (07-16-21 @ 10:53)    Glucose:   BP: 108/76 (07-21-21 @ 07:33) (108/76 - 108/76)  Lipid Panel: Date/Time: 07-16-21 @ 10:53  Cholesterol, Serum: 176  Direct LDL: --  HDL Cholesterol, Serum: 49  Total Cholesterol/HDL Ration Measurement: --  Triglycerides, Serum: 147  
BMI: BMI (kg/m2): 25.4 (07-15-21 @ 14:25)  HbA1c: A1C with Estimated Average Glucose Result: 5.5 % (07-16-21 @ 10:53)    Glucose:   BP: 124/82 (07-15-21 @ 12:39) (124/82 - 124/82)  Lipid Panel: Date/Time: 07-16-21 @ 10:53  Cholesterol, Serum: 176  Direct LDL: --  HDL Cholesterol, Serum: 49  Total Cholesterol/HDL Ration Measurement: --  Triglycerides, Serum: 147  
BMI: BMI (kg/m2): 25.4 (07-15-21 @ 14:25)  HbA1c: A1C with Estimated Average Glucose Result: 5.5 % (07-16-21 @ 10:53)    Glucose:   BP: 124/82 (07-15-21 @ 12:39) (111/69 - 130/86)  Lipid Panel: Date/Time: 07-16-21 @ 10:53  Cholesterol, Serum: 176  Direct LDL: --  HDL Cholesterol, Serum: 49  Total Cholesterol/HDL Ration Measurement: --  Triglycerides, Serum: 147  
BMI: BMI (kg/m2): 25.4 (07-15-21 @ 14:25)  HbA1c: A1C with Estimated Average Glucose Result: 5.5 % (07-16-21 @ 10:53)    Glucose:   BP: 103/76 (07-19-21 @ 07:47) (103/76 - 103/76)  Lipid Panel: Date/Time: 07-16-21 @ 10:53  Cholesterol, Serum: 176  Direct LDL: --  HDL Cholesterol, Serum: 49  Total Cholesterol/HDL Ration Measurement: --  Triglycerides, Serum: 147  
BMI: BMI (kg/m2): 25.4 (07-15-21 @ 14:25)  HbA1c: A1C with Estimated Average Glucose Result: 5.5 % (07-16-21 @ 10:53)    Glucose:   BP: 108/76 (07-21-21 @ 07:33) (108/76 - 108/76)  Lipid Panel: Date/Time: 07-16-21 @ 10:53  Cholesterol, Serum: 176  Direct LDL: --  HDL Cholesterol, Serum: 49  Total Cholesterol/HDL Ration Measurement: --  Triglycerides, Serum: 147  
BMI: BMI (kg/m2): 25.4 (07-15-21 @ 14:25)  HbA1c: A1C with Estimated Average Glucose Result: 5.5 % (07-16-21 @ 10:53)    Glucose:   BP: 116/77 (07-26-21 @ 08:19) (116/77 - 116/77)  Lipid Panel: Date/Time: 07-16-21 @ 10:53  Cholesterol, Serum: 176  Direct LDL: --  HDL Cholesterol, Serum: 49  Total Cholesterol/HDL Ration Measurement: --  Triglycerides, Serum: 147

## 2021-07-26 NOTE — BH INPATIENT PSYCHIATRY PROGRESS NOTE - CURRENT MEDICATION
Late Entry  Study: Arcenio Villalobos MD: Dr. Meneses    Writer spoke with the patient and her  today (1/10/17) regarding the process for the Mammaprint.  Per the study the patient would be billed to her insurance.  The insurance company and Arcenio would work together regarding any payment.  Per the study the insurance may deny the bill but an appeals process would be initiated at that point.  If the patient were to see any bills then they would need to let the study know.  Writer would also put in a pre-auth for consenting to the study.  If the insurance states they would not pay for any part of the study, Research RN would let the patient know so she can make an informed decision moving forward.  The patient has decided that she does not want to participate in the study and the writer informed Dr. Meneses and Cancer Navigator.   
MEDICATIONS  (STANDING):  ARIPiprazole 5 milliGRAM(s) Oral daily  fluvoxaMINE 200 milliGRAM(s) Oral daily    MEDICATIONS  (PRN):  hydrOXYzine hydrochloride 25 milliGRAM(s) Oral every 6 hours PRN Anxiety  LORazepam     Tablet 2 milliGRAM(s) Oral every 6 hours PRN severe anxiety  LORazepam   Injectable 2 milliGRAM(s) IntraMuscular once PRN severe anxiety  
MEDICATIONS  (STANDING):  fluvoxaMINE 200 milliGRAM(s) Oral daily  QUEtiapine 150 milliGRAM(s) Oral at bedtime    MEDICATIONS  (PRN):  acetaminophen   Tablet .. 650 milliGRAM(s) Oral every 6 hours PRN Temp greater or equal to 38C (100.4F), Moderate Pain (4 - 6), Severe Pain (7 - 10)  hydrOXYzine hydrochloride 50 milliGRAM(s) Oral every 6 hours PRN anxiety/insomnia  LORazepam     Tablet 2 milliGRAM(s) Oral every 6 hours PRN severe anxiety  LORazepam   Injectable 2 milliGRAM(s) IntraMuscular once PRN severe anxiety  
MEDICATIONS  (STANDING):  fluvoxaMINE 200 milliGRAM(s) Oral daily  QUEtiapine 250 milliGRAM(s) Oral at bedtime    MEDICATIONS  (PRN):  acetaminophen   Tablet .. 650 milliGRAM(s) Oral every 6 hours PRN Temp greater or equal to 38C (100.4F), Moderate Pain (4 - 6), Severe Pain (7 - 10)  hydrOXYzine hydrochloride 50 milliGRAM(s) Oral every 6 hours PRN anxiety/insomnia  LORazepam     Tablet 2 milliGRAM(s) Oral every 6 hours PRN severe anxiety  LORazepam   Injectable 2 milliGRAM(s) IntraMuscular once PRN severe anxiety  
MEDICATIONS  (STANDING):  fluvoxaMINE 200 milliGRAM(s) Oral daily  QUEtiapine 150 milliGRAM(s) Oral at bedtime    MEDICATIONS  (PRN):  acetaminophen   Tablet .. 650 milliGRAM(s) Oral every 6 hours PRN Temp greater or equal to 38C (100.4F), Moderate Pain (4 - 6), Severe Pain (7 - 10)  hydrOXYzine hydrochloride 50 milliGRAM(s) Oral every 6 hours PRN anxiety/insomnia  LORazepam     Tablet 2 milliGRAM(s) Oral every 6 hours PRN severe anxiety  LORazepam   Injectable 2 milliGRAM(s) IntraMuscular once PRN severe anxiety  
MEDICATIONS  (STANDING):  fluvoxaMINE 200 milliGRAM(s) Oral daily  QUEtiapine 300 milliGRAM(s) Oral at bedtime    MEDICATIONS  (PRN):  acetaminophen   Tablet .. 650 milliGRAM(s) Oral every 6 hours PRN Temp greater or equal to 38C (100.4F), Moderate Pain (4 - 6), Severe Pain (7 - 10)  hydrOXYzine hydrochloride 50 milliGRAM(s) Oral every 6 hours PRN anxiety/insomnia  LORazepam     Tablet 2 milliGRAM(s) Oral every 6 hours PRN severe anxiety  LORazepam   Injectable 2 milliGRAM(s) IntraMuscular once PRN severe anxiety  
MEDICATIONS  (STANDING):  ARIPiprazole 5 milliGRAM(s) Oral daily  fluvoxaMINE 200 milliGRAM(s) Oral daily    MEDICATIONS  (PRN):  hydrOXYzine hydrochloride 25 milliGRAM(s) Oral every 6 hours PRN Anxiety  LORazepam     Tablet 2 milliGRAM(s) Oral every 6 hours PRN severe anxiety  LORazepam   Injectable 2 milliGRAM(s) IntraMuscular once PRN severe anxiety  
MEDICATIONS  (STANDING):  fluvoxaMINE 200 milliGRAM(s) Oral daily  QUEtiapine 100 milliGRAM(s) Oral at bedtime    MEDICATIONS  (PRN):  acetaminophen   Tablet .. 650 milliGRAM(s) Oral every 6 hours PRN Temp greater or equal to 38C (100.4F), Moderate Pain (4 - 6), Severe Pain (7 - 10)  hydrOXYzine hydrochloride 50 milliGRAM(s) Oral every 6 hours PRN anxiety/insomnia  LORazepam     Tablet 2 milliGRAM(s) Oral every 6 hours PRN severe anxiety  LORazepam   Injectable 2 milliGRAM(s) IntraMuscular once PRN severe anxiety  
MEDICATIONS  (STANDING):  ARIPiprazole 5 milliGRAM(s) Oral daily  fluvoxaMINE 200 milliGRAM(s) Oral daily    MEDICATIONS  (PRN):  hydrOXYzine hydrochloride 25 milliGRAM(s) Oral every 6 hours PRN Anxiety  LORazepam     Tablet 2 milliGRAM(s) Oral every 6 hours PRN severe anxiety  LORazepam   Injectable 2 milliGRAM(s) IntraMuscular once PRN severe anxiety  
MEDICATIONS  (STANDING):  fluvoxaMINE 200 milliGRAM(s) Oral daily  QUEtiapine 50 milliGRAM(s) Oral at bedtime    MEDICATIONS  (PRN):  acetaminophen   Tablet .. 650 milliGRAM(s) Oral every 6 hours PRN Temp greater or equal to 38C (100.4F), Moderate Pain (4 - 6), Severe Pain (7 - 10)  hydrOXYzine hydrochloride 25 milliGRAM(s) Oral every 6 hours PRN Anxiety  LORazepam     Tablet 2 milliGRAM(s) Oral every 6 hours PRN severe anxiety  LORazepam   Injectable 2 milliGRAM(s) IntraMuscular once PRN severe anxiety

## 2021-07-26 NOTE — BH INPATIENT PSYCHIATRY PROGRESS NOTE - NSBHMSESPEECH_PSY_A_CORE
Normal volume, rate, productivity, spontaneity and articulation
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
## 2021-07-26 NOTE — BH INPATIENT PSYCHIATRY PROGRESS NOTE - NSBHFUPINTERVALCCFT_PSY_A_CORE
Patient seen, chart reviewed, case discussed with team.  Patient seen for FU MDD.  
"I'm thinking about the bigger picture"
"I felt jittery"
Pt. is concerned with discharge
Patient seen, chart reviewed, case discused with team.  Diane seen for FU MDD.  
"I have zero depression"
"I took because I needed to get away"
Pt. is concerned with discharge
Pt. is  discharge focused

## 2021-07-26 NOTE — BH INPATIENT PSYCHIATRY DISCHARGE NOTE - NSDCMRMEDTOKEN_GEN_ALL_CORE_FT
Daily Note     Today's date: 2021  Patient name: Richard Gardner  : 1945  MRN: [id number]  Referring provider: ZAHRA Hodgson  Dx:   Encounter Diagnosis     ICD-10-CM    1  Primary osteoarthritis of both knees  M17 0    2  Elbow pain, right  M25 521    3  Acute pain of both knees  M25 561     M25 562    4  Acute low back pain without sciatica, unspecified back pain laterality  M54 5    5  Cervical pain  M54 2        Start Time: 1545          Subjective: Pt arrived to therapy on wrong day/time for appointment and was accommodated  Reports increased cervical pain  Objective: See treatment diary below  Assessment: Frequent cuing throughout tx for proper form, sets and reps  Plan: Continue per plan of care        Precautions: requires close supervision for correct exercise form due to cognitive deficits, GERD, DM, HTN, Hx of meningioma, headaches      Manuals 12/30 1/5       12/11 12/15   B Knee PROM             R elbow PROM             IASTM: R UT/LS 4 min 5 min       5 min 5 min   IASTM: R QL 4 min 5 min       5 min 5 min   Neuro Re-Ed             Biodex: FT EO             Biodex: FT EC             Biodex: semi-tandem             B Quad Set             Scap Retraction 5"x15 5"x15       5"x15 5"x15   TA Set 5"x15 5"x15       5"x15 5"x15   Supine Clamshells 3x12 RTB 3x12  RTB       3x10 RTB 3x10 RTB   Ther Ex             B Heel slides             Gastroc St               HS St               SAQ             Elbow Flex St               Elbow Ext St               Elbow AROM             Bike: S10 5 min 5 min        5 min   UT St   20"x3 20"x3       20"x3 ea 20"x3 ea   LS St   20"x3 20"x3       20"x3 ea 20"x3 ea   Seated Lumbar Flex St   20"x3        20"x3 20"x3   B LTR 20"x3 ea 20"x3   ea       20"x3 ea 20"x3 ea                Ther Activity                                       Gait Training                                       Modalities fluvoxaMINE 100 mg oral tablet: 2 tab(s) orally once a day  QUEtiapine 300 mg oral tablet, extended release: 1 tab(s) orally once a day (in the evening)

## 2021-07-26 NOTE — BH INPATIENT PSYCHIATRY PROGRESS NOTE - NSBHCONSBHPROVDETAILS_PSY_A_CORE  FT
Dr. Aponte from [facility] [phone]
Dr. Aponte from [facility] [phone]  Dr. Brown outpatient psychiatrist also contacted
Dr. Aponte from [facility] [phone]
Dr. Aponte from [facility] [phone]
Dr. Aponte from [facility] [phone]  Dr. Brown outpatient psychiatrist also contacted
Dr. Aponte from [facility] [phone]
Dr. Aponte from [facility] [phone]
Dr. Aponte from [facility] [phone]
Dr. Aponte from [facility] [phone]

## 2021-07-26 NOTE — BH PSYCHOLOGY - CLINICIAN PSYCHOTHERAPY NOTE - NSBHPSYCHOLBILLFAM_PSY_A_CORE
17852 - 38 to 52 minutes
78803 - 38 to 52 minutes
17098 - 53 minutes and above
19259 - 16 to 37 minutes

## 2021-07-26 NOTE — BH INPATIENT PSYCHIATRY DISCHARGE NOTE - HOSPITAL COURSE
to be completed Upon admission, the patient endorsed depression with passive SI, denying intent or plan to harm himself. He was admitted after being discharged from [facility] in [place] (d/c 7/14/21) for depression with recent suicide attempts. In 4/21, the pt.  contemplated shooting himself with a shot gun, placing one bullet in the barrel, held the gun and decided to remove the bullet. He reports the gun is now with his cousin (the owner) in [facility]. The pt. also reported on 6/30/21 he drove to [place], drank several beers, took a garden hose, placed it on his car’s tail pipe and attempted to kill himself via carbon monoxide poisoning. He reports he aborted the attempt after feeling ill and becoming frightened that he would die. He then drove himself to the hospital in [place]. The pt. reported he felt his symptoms improved after the hospitalization, but then became anxious about returning home to his wife, his SI worsened and thought he would hand himself he returned home. The pt. agreed to the addition of an antipsychotic given his recent behaviors mimicked a psychotic presentation, given the rapid onset of his symptoms and severity of attempts. 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 The patient agreed to discontinuation of lithium for elevated creatinine and TSH. Repeat levels after discontinuation of lithium revealed normal TSH and free T4, however, creatinine remained slightly elevated. The pt. agreed to trial Abilify but reported akathisia at 5mg. He did not want to continue the medication and agreed to Seroquel. The patient’s Sx gradually improved: he remained adherent with individual and group therapy, he was also compliant with medication regimen. He eventually denied depression Sx including hopelessness, insomnia, SI/i/p. The pt. submitted a 72-hour letter, stating he wanted to be discharged in order to join his wife at a family vacation in [place]. The pt. was advised that insurance would not cover his admission to [facility] if he was discharged earlier than the Monday prior to his start date. The pt. resisted, however, after speaking with the attending, writer and his wife, he agreed to remain hospitalized. On the day of discharge, the pt. denied all presenting Sx including hopelessness and SI/I/p. He denied AVH and delusions for the duration of the hospitalization. The pt. stated his protective factors include his family, specifically his children. He plans to go boat riding with his friend and his family today and is looking forward to seeing his wife when she returns.     Patient was maintained on Luvox 200mg daily. Seroquel initiated and titrated to 300mg HS. All medications given with good effect and tolerability. Symptoms gradually improved over the course of hospitalization. The patient was made aware of the risks and benefits of each medication and tolerated them well. Side effect of akathisia reported on Abilify 5mg; elevated TSH and Cr noted while on lithium.     There were no behavioral problems on the unit.  Patient did not become agitated, did not require emergency intramuscular medications or seclusion/restraints, and did not self-harm on the unit. Patient remained actively engaged in treatment and participated in individual, group, and milieu therapy.  Patient got along appropriately with staff and peers.  A family meeting was held prior to discharge for safety planning and disposition planning. Family is supportive and available.      Medically, during this hospitalization he remained stable.     Suicidal and aggression risk assessments were performed on the day of discharge. The patient is at elevated chronic risk of self-harm due to an underlying mood disorder and evidence of cluster B personality traits. He is not actively suicidal or manic, making him appropriate for less restrictive treatment as an outpatient without need for continued inpatient hospitalization. Protective factors for suicide include future orientation, social support, dependents, treatment engagement, med compliance, lack of substance use, engagement in work/school, good physical health. Risk factors include access, insomnia, impulsivity, male gender, trauma, recent SA, recent Hx of SI.     Immediate risk was minimized by inpatient admission to a safe environment with appropriate supervision and limited access to lethal means. Future risk was minimized before discharge by treatment of anxiety/depressive symptoms, maximizing outpatient support, providing relevant patient education, discussing emergency procedures, and ensuring close follow-up. Patient denies all suicidal and aggressive/homicidal ideation, intent and plan, and, in view of demonstrated clinical improvement, is not judged to be an acute danger to self or others at this time. Although the patient remains at their chronic baseline risk of self-harm, such risk cannot be further ameliorated by continued inpatient treatment and the patient is therefore appropriate for discharge.     On the day of discharge, the patient’s mood and affect are normal/euthymic. Patient denies depressed mood and anxiety. Patient is not hopeless. Sleep and appetite are also normal (at baseline).  Pt’s motor performance and productivity of speech are WNL. Pt denies symptoms of psychosis including AH/VH with no apparent delusions (or is at baseline/not preoccupied with delusions).  Patient denies S/H/I/I/P.     Patient has made clinically meaningful progress during this hospitalization and has clearly benefited from medications and psychotherapy. On day of discharge, the patient has improved significantly and no longer requires inpatient treatment and care. Pt will be discharged and follow-up with outpatient care.      Patient was provided with extensive psychoeducation on treatment options and motivational counseling targeting healthy lifestyle. Patient was instructed on actions for crisis situations, understood and agreed to follow instructions for handling crisis, including coming to ER or calling 911 should the patient or their family feel that they are in danger of hurting self or others. Patient also was given Suicide Prevention Lifeline number 1-345.330.7677 and provided with instructions on its use.   Patient was advised to avoid driving until their reactions are not impaired by medications. Motivational counseling was provided. Family is supportive and was provided with similar safety plan instructions.     Patient will be discharged with the following DSM5 Diagnoses:    PRINCIPAL DISCHARGE DIAGNOSIS  Diagnosis: Severe single current episode of major depressive disorder, with psychotic features      Patient will be discharged on the following medications:   fluvoxaMINE 100 mg oral tablet: 2 tab(s) orally once a day  QUEtiapine 300 mg oral tablet, extended release: 1 tab(s) orally once a day (in the evening)    Handoff emailed to [facility], Dr. Reina Dunham, [phone], including discussion of hospital course, treatment, and medications. The patient’s appointment is on 29-Jul-2021 at 11:00AM.  Inpatient Provider:  Nancy Krishnan, LEVI  [phone]

## 2021-07-26 NOTE — BH INPATIENT PSYCHIATRY DISCHARGE NOTE - OTHER PAST PSYCHIATRIC HISTORY (INCLUDE DETAILS REGARDING ONSET, COURSE OF ILLNESS, INPATIENT/OUTPATIENT TREATMENT)
PPH of MDD, 2 past inpatient hospitalizations, [facility] 04/2021 and most recent at [facility] in [place] (discharged 07/14), reports of 2 past aborted suicide attempts (contemplated shooting slet with a shot gun on 06/30/21, attempted carbon monoxide poisoning), prescribed Luvox 200mg, and lithium 600 mg. daily, struggled with sex addiction - pornography

## 2021-07-26 NOTE — BH INPATIENT PSYCHIATRY PROGRESS NOTE - NSTXSUICIDPROGRES_PSY_ALL_CORE
Improving
Improving
Met - goal discontinued
Improving

## 2021-07-26 NOTE — BH PSYCHOLOGY - CLINICIAN PSYCHOTHERAPY NOTE - NSTXSUICIDGOAL_PSY_ALL_CORE
Will identify and utilize 2 coping skills
Talk to staff and ask for assistance when having suicidal wishes instead of acting out
Talk to staff and ask for assistance when having suicidal wishes instead of acting out
Will identify and utilize 2 coping skills

## 2021-07-26 NOTE — BH INPATIENT PSYCHIATRY PROGRESS NOTE - NSTXSUICIDGOAL_PSY_ALL_CORE
Will identify and utilize 2 coping skills
Talk to staff and ask for assistance when having suicidal wishes instead of acting out
Will identify and utilize 2 coping skills
Talk to staff and ask for assistance when having suicidal wishes instead of acting out
Talk to staff and ask for assistance when having suicidal wishes instead of acting out
Will identify and utilize 2 coping skills
Will identify and utilize 2 coping skills
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
## 2021-07-26 NOTE — BH PSYCHOLOGY - CLINICIAN PSYCHOTHERAPY NOTE - NSBHPSYCHOLGOALS_PSY_A_CORE
Decrease symptoms/Assessment/Improve level of independent functioning/Improve social/vocational/coping skills

## 2021-07-29 ENCOUNTER — OUTPATIENT (OUTPATIENT)
Dept: OUTPATIENT SERVICES | Facility: HOSPITAL | Age: 61
LOS: 1 days | Discharge: HOME | End: 2021-07-29
Payer: COMMERCIAL

## 2021-07-29 PROCEDURE — 99443: CPT

## 2021-07-30 DIAGNOSIS — F32.3 MAJOR DEPRESSIVE DISORDER, SINGLE EPISODE, SEVERE WITH PSYCHOTIC FEATURES: ICD-10-CM

## 2021-08-03 PROCEDURE — 99214 OFFICE O/P EST MOD 30 MIN: CPT

## 2021-08-10 PROCEDURE — 99214 OFFICE O/P EST MOD 30 MIN: CPT

## 2021-08-10 PROCEDURE — 90832 PSYTX W PT 30 MINUTES: CPT

## 2021-08-13 PROCEDURE — 90832 PSYTX W PT 30 MINUTES: CPT

## 2021-08-17 PROCEDURE — 99212 OFFICE O/P EST SF 10 MIN: CPT

## 2021-08-23 PROCEDURE — 99212 OFFICE O/P EST SF 10 MIN: CPT

## 2021-08-26 ENCOUNTER — OUTPATIENT (OUTPATIENT)
Dept: OUTPATIENT SERVICES | Facility: HOSPITAL | Age: 61
LOS: 1 days | Discharge: NURSING, REHAB OR LONG-TERM CARE | End: 2021-08-26
Payer: COMMERCIAL

## 2021-08-30 PROCEDURE — 90791 PSYCH DIAGNOSTIC EVALUATION: CPT | Mod: 95

## 2021-09-09 ENCOUNTER — INPATIENT (INPATIENT)
Facility: HOSPITAL | Age: 61
LOS: 7 days | Discharge: HOME | End: 2021-09-17
Attending: PSYCHIATRY & NEUROLOGY | Admitting: PSYCHIATRY & NEUROLOGY
Payer: COMMERCIAL

## 2021-09-09 VITALS — HEIGHT: 73 IN | WEIGHT: 230.38 LBS | TEMPERATURE: 98 F

## 2021-09-09 DIAGNOSIS — F32.9 MAJOR DEPRESSIVE DISORDER, SINGLE EPISODE, UNSPECIFIED: ICD-10-CM

## 2021-09-09 LAB
A1C WITH ESTIMATED AVERAGE GLUCOSE RESULT: 5.3 % — SIGNIFICANT CHANGE UP (ref 4–5.6)
ALBUMIN SERPL ELPH-MCNC: 4.6 G/DL — SIGNIFICANT CHANGE UP (ref 3.3–5)
ALP SERPL-CCNC: 56 U/L — SIGNIFICANT CHANGE UP (ref 40–120)
ALT FLD-CCNC: 22 U/L — SIGNIFICANT CHANGE UP (ref 4–41)
ANION GAP SERPL CALC-SCNC: 15 MMOL/L — HIGH (ref 7–14)
AST SERPL-CCNC: 19 U/L — SIGNIFICANT CHANGE UP (ref 4–40)
BASOPHILS # BLD AUTO: 0.07 K/UL — SIGNIFICANT CHANGE UP (ref 0–0.2)
BASOPHILS NFR BLD AUTO: 1.4 % — SIGNIFICANT CHANGE UP (ref 0–2)
BILIRUB SERPL-MCNC: 0.7 MG/DL — SIGNIFICANT CHANGE UP (ref 0.2–1.2)
BUN SERPL-MCNC: 13 MG/DL — SIGNIFICANT CHANGE UP (ref 7–23)
CALCIUM SERPL-MCNC: 9.2 MG/DL — SIGNIFICANT CHANGE UP (ref 8.4–10.5)
CHLORIDE SERPL-SCNC: 100 MMOL/L — SIGNIFICANT CHANGE UP (ref 98–107)
CO2 SERPL-SCNC: 24 MMOL/L — SIGNIFICANT CHANGE UP (ref 22–31)
COVID-19 SPIKE DOMAIN AB INTERP: NEGATIVE — SIGNIFICANT CHANGE UP
COVID-19 SPIKE DOMAIN ANTIBODY RESULT: 0.4 U/ML — SIGNIFICANT CHANGE UP
CREAT SERPL-MCNC: 1.32 MG/DL — HIGH (ref 0.5–1.3)
EOSINOPHIL # BLD AUTO: 0.15 K/UL — SIGNIFICANT CHANGE UP (ref 0–0.5)
EOSINOPHIL NFR BLD AUTO: 3 % — SIGNIFICANT CHANGE UP (ref 0–6)
ESTIMATED AVERAGE GLUCOSE: 105 — SIGNIFICANT CHANGE UP
GLUCOSE SERPL-MCNC: 96 MG/DL — SIGNIFICANT CHANGE UP (ref 70–99)
HCT VFR BLD CALC: 49.1 % — SIGNIFICANT CHANGE UP (ref 39–50)
HGB BLD-MCNC: 16.6 G/DL — SIGNIFICANT CHANGE UP (ref 13–17)
IANC: 2.65 K/UL — SIGNIFICANT CHANGE UP (ref 1.5–8.5)
IMM GRANULOCYTES NFR BLD AUTO: 0.2 % — SIGNIFICANT CHANGE UP (ref 0–1.5)
LYMPHOCYTES # BLD AUTO: 1.84 K/UL — SIGNIFICANT CHANGE UP (ref 1–3.3)
LYMPHOCYTES # BLD AUTO: 36.6 % — SIGNIFICANT CHANGE UP (ref 13–44)
MCHC RBC-ENTMCNC: 30.1 PG — SIGNIFICANT CHANGE UP (ref 27–34)
MCHC RBC-ENTMCNC: 33.8 GM/DL — SIGNIFICANT CHANGE UP (ref 32–36)
MCV RBC AUTO: 88.9 FL — SIGNIFICANT CHANGE UP (ref 80–100)
MONOCYTES # BLD AUTO: 0.31 K/UL — SIGNIFICANT CHANGE UP (ref 0–0.9)
MONOCYTES NFR BLD AUTO: 6.2 % — SIGNIFICANT CHANGE UP (ref 2–14)
NEUTROPHILS # BLD AUTO: 2.65 K/UL — SIGNIFICANT CHANGE UP (ref 1.8–7.4)
NEUTROPHILS NFR BLD AUTO: 52.6 % — SIGNIFICANT CHANGE UP (ref 43–77)
NRBC # BLD: 0 /100 WBCS — SIGNIFICANT CHANGE UP
NRBC # FLD: 0 K/UL — SIGNIFICANT CHANGE UP
PLATELET # BLD AUTO: 247 K/UL — SIGNIFICANT CHANGE UP (ref 150–400)
POTASSIUM SERPL-MCNC: 3.1 MMOL/L — LOW (ref 3.5–5.3)
POTASSIUM SERPL-SCNC: 3.1 MMOL/L — LOW (ref 3.5–5.3)
PROT SERPL-MCNC: 7.3 G/DL — SIGNIFICANT CHANGE UP (ref 6–8.3)
RBC # BLD: 5.52 M/UL — SIGNIFICANT CHANGE UP (ref 4.2–5.8)
RBC # FLD: 13.1 % — SIGNIFICANT CHANGE UP (ref 10.3–14.5)
SARS-COV-2 IGG+IGM SERPL QL IA: 0.4 U/ML — SIGNIFICANT CHANGE UP
SARS-COV-2 IGG+IGM SERPL QL IA: NEGATIVE — SIGNIFICANT CHANGE UP
SODIUM SERPL-SCNC: 139 MMOL/L — SIGNIFICANT CHANGE UP (ref 135–145)
TSH SERPL-MCNC: 2.45 UIU/ML — SIGNIFICANT CHANGE UP (ref 0.27–4.2)
WBC # BLD: 5.03 K/UL — SIGNIFICANT CHANGE UP (ref 3.8–10.5)
WBC # FLD AUTO: 5.03 K/UL — SIGNIFICANT CHANGE UP (ref 3.8–10.5)

## 2021-09-09 PROCEDURE — 99222 1ST HOSP IP/OBS MODERATE 55: CPT

## 2021-09-09 RX ORDER — FLUVOXAMINE MALEATE 25 MG/1
200 TABLET ORAL AT BEDTIME
Refills: 0 | Status: DISCONTINUED | OUTPATIENT
Start: 2021-09-09 | End: 2021-09-10

## 2021-09-09 RX ORDER — QUETIAPINE FUMARATE 200 MG/1
200 TABLET, FILM COATED ORAL AT BEDTIME
Refills: 0 | Status: DISCONTINUED | OUTPATIENT
Start: 2021-09-09 | End: 2021-09-17

## 2021-09-09 RX ORDER — HALOPERIDOL DECANOATE 100 MG/ML
5 INJECTION INTRAMUSCULAR EVERY 6 HOURS
Refills: 0 | Status: DISCONTINUED | OUTPATIENT
Start: 2021-09-09 | End: 2021-09-17

## 2021-09-09 RX ORDER — QUETIAPINE FUMARATE 200 MG/1
200 TABLET, FILM COATED ORAL ONCE
Refills: 0 | Status: COMPLETED | OUTPATIENT
Start: 2021-09-09 | End: 2021-09-09

## 2021-09-09 RX ORDER — DIPHENHYDRAMINE HCL 50 MG
50 CAPSULE ORAL ONCE
Refills: 0 | Status: DISCONTINUED | OUTPATIENT
Start: 2021-09-09 | End: 2021-09-17

## 2021-09-09 RX ORDER — HALOPERIDOL DECANOATE 100 MG/ML
5 INJECTION INTRAMUSCULAR ONCE
Refills: 0 | Status: DISCONTINUED | OUTPATIENT
Start: 2021-09-09 | End: 2021-09-17

## 2021-09-09 RX ORDER — FLUVOXAMINE MALEATE 25 MG/1
200 TABLET ORAL ONCE
Refills: 0 | Status: COMPLETED | OUTPATIENT
Start: 2021-09-09 | End: 2021-09-09

## 2021-09-09 RX ORDER — ACETAMINOPHEN 500 MG
650 TABLET ORAL EVERY 6 HOURS
Refills: 0 | Status: DISCONTINUED | OUTPATIENT
Start: 2021-09-09 | End: 2021-09-17

## 2021-09-09 RX ORDER — DIPHENHYDRAMINE HCL 50 MG
50 CAPSULE ORAL EVERY 6 HOURS
Refills: 0 | Status: DISCONTINUED | OUTPATIENT
Start: 2021-09-09 | End: 2021-09-17

## 2021-09-09 RX ADMIN — QUETIAPINE FUMARATE 200 MILLIGRAM(S): 200 TABLET, FILM COATED ORAL at 20:48

## 2021-09-09 RX ADMIN — QUETIAPINE FUMARATE 200 MILLIGRAM(S): 200 TABLET, FILM COATED ORAL at 02:14

## 2021-09-09 RX ADMIN — FLUVOXAMINE MALEATE 200 MILLIGRAM(S): 25 TABLET ORAL at 02:14

## 2021-09-09 RX ADMIN — FLUVOXAMINE MALEATE 200 MILLIGRAM(S): 25 TABLET ORAL at 20:48

## 2021-09-09 NOTE — BH INPATIENT PSYCHIATRY ASSESSMENT NOTE - NSBHMETABOLIC_PSY_ALL_CORE_FT
BMI: BMI (kg/m2): 30.4 (09-09-21 @ 02:00)  HbA1c: A1C with Estimated Average Glucose Result: 5.3 % (09-09-21 @ 10:59)    Glucose:   BP: --  Lipid Panel: Date/Time: 07-16-21 @ 10:53  Cholesterol, Serum: 176  Direct LDL: --  HDL Cholesterol, Serum: 49  Total Cholesterol/HDL Ration Measurement: --  Triglycerides, Serum: 147   BMI: BMI (kg/m2): 30.4 (09-09-21 @ 02:00)  HbA1c: A1C with Estimated Average Glucose Result: 5.3 % (09-09-21 @ 10:59)    Glucose:   BP: 105/62 (09-10-21 @ 05:51) (105/62 - 105/62)  Lipid Panel: Date/Time: 09-10-21 @ 09:47  Cholesterol, Serum: 214  Direct LDL: --  HDL Cholesterol, Serum: 54  Total Cholesterol/HDL Ration Measurement: --  Triglycerides, Serum: 176
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
## 2021-09-09 NOTE — BH PATIENT PROFILE - STATED REASON FOR ADMISSION
Brought self to [facility] with worsening depressive symptoms and suicidal thoughts. Patient reports that no plan at present but had prior attempts. Denies suicidal thoughts at present. Reports having guilt and shame and being a burden to his family made his symptoms worse. He wants to get help with his depression. Brought self to [facility] by his son for worsening depressive symptoms and suicidal thoughts. Patient reports that no plan at present but had prior attempts. Denies suicidal thoughts at present. Reports having guilt and shame and being a burden to his family made his symptoms worse.   His shame and guilt is primarily focused on his diagnosis of sex addiction and how this affects his relationship with his wife. He endorses passive suicidal ideation, but denies current intent or plan. He wants to get help with his depression.

## 2021-09-09 NOTE — BH INPATIENT PSYCHIATRY ASSESSMENT NOTE - NSCOMMENTSUICRISKFACT_PSY_ALL_CORE
Overall, the patient is an acute and imminent risk of harm and meets criteria for psychiatric hospitalization for safety and stabilization.

## 2021-09-09 NOTE — BH INPATIENT PSYCHIATRY ASSESSMENT NOTE - RISK ASSESSMENT
Risk factors include mood disorder, multiple recent hospitalization, hx SAs. Protective factors include supportive family, engagement in work, and engagement in treatment.

## 2021-09-09 NOTE — BH PATIENT PROFILE - HOME MEDICATIONS
QUEtiapine 300 mg oral tablet, extended release , 1 tab(s) orally once a day (in the evening)  fluvoxaMINE 100 mg oral tablet , 2 tab(s) orally once a day

## 2021-09-09 NOTE — BH INPATIENT PSYCHIATRY ASSESSMENT NOTE - CURRENT MEDICATION
MEDICATIONS  (STANDING):  fluvoxaMINE 200 milliGRAM(s) Oral at bedtime  QUEtiapine 200 milliGRAM(s) Oral at bedtime    MEDICATIONS  (PRN):  acetaminophen   Tablet .. 650 milliGRAM(s) Oral every 6 hours PRN Temp greater or equal to 38C (100.4F), Mild Pain (1 - 3), Moderate Pain (4 - 6)  diphenhydrAMINE 50 milliGRAM(s) Oral every 6 hours PRN Agitation  diphenhydrAMINE   Injectable 50 milliGRAM(s) IntraMuscular once PRN Agitation  haloperidol     Tablet 5 milliGRAM(s) Oral every 6 hours PRN Agitation  haloperidol    Injectable 5 milliGRAM(s) IntraMuscular once PRN Agitation  LORazepam     Tablet 2 milliGRAM(s) Oral every 6 hours PRN Anxiety  LORazepam   Injectable 2 milliGRAM(s) IntraMuscular once PRN Agitation   MEDICATIONS  (STANDING):  fluvoxaMINE 150 milliGRAM(s) Oral at bedtime  QUEtiapine 200 milliGRAM(s) Oral at bedtime    MEDICATIONS  (PRN):  acetaminophen   Tablet .. 650 milliGRAM(s) Oral every 6 hours PRN Temp greater or equal to 38C (100.4F), Mild Pain (1 - 3), Moderate Pain (4 - 6)  diphenhydrAMINE 50 milliGRAM(s) Oral every 6 hours PRN Agitation  diphenhydrAMINE   Injectable 50 milliGRAM(s) IntraMuscular once PRN Agitation  haloperidol     Tablet 5 milliGRAM(s) Oral every 6 hours PRN Agitation  haloperidol    Injectable 5 milliGRAM(s) IntraMuscular once PRN Agitation  LORazepam     Tablet 2 milliGRAM(s) Oral every 6 hours PRN Anxiety  LORazepam   Injectable 2 milliGRAM(s) IntraMuscular once PRN Agitation

## 2021-09-09 NOTE — BH INPATIENT PSYCHIATRY ASSESSMENT NOTE - MSE UNSTRUCTURED FT
The patient appears stated age, fair hygiene and dressed appropriately. The patient was calm and cooperative with the interview and maintained appropriate eye contact. No psychomotor agitation or retardation noted. Steady gait observed. The patient's speech was fluent, normal in tone, rate, and volume. The patient's mood is "depressed”. His affect is depressed and anxious. Thought process is goal directed, overinclusive at times. No delusional content. Endorses passive SI, denies intent or plan. Denies HIIP. hallucinations. Cognition AAOx3. Insight is fair. Judgment is fair. Impulse control has been fair on the unit.

## 2021-09-09 NOTE — PSYCHIATRIC REHAB INITIAL EVALUATION - NSBHALCSUBTREAT_PSY_ALL_CORE
Patient is currently following up with [facility] PACE Program/Partial Hospitalization Program (PHP)

## 2021-09-09 NOTE — PSYCHIATRIC REHAB INITIAL EVALUATION - NSBHSTRENGTHHOME_PSY_ALL_CORE
At baseline, patient is able to care for self and ADLs appropriately. Patient is domiciled and reports having a supportive family.
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
## 2021-09-09 NOTE — BH INPATIENT PSYCHIATRY ASSESSMENT NOTE - NSBHASSESSSUMMFT_PSY_ALL_CORE
Duke Vallejo is a 60 y/o male domiciled with wife, employed as a  with [employer] of [employer], 3 past inpatient hospitalizations, most recent on [facility] L4 (07/15/21-07/26/21), in outpatient treatment with Dr. Rivas at [facility] PACE program (started 08/30/21, completed [facility] PHP on 08/27/21), pt also in 12 step [facility] for sex addiction (watching pornography and masturbating), reports of 2 past aborted SA (contemplating shooting himself with a shot gun 04/21 and self-aborted carbon monoxide poisoning on 06/30/21), no reported hx of violence/aggression/legal issues, social Etoh use, denies other past and current substance use, no PMH, who was BIB son to [facility] ED for worsening depression and suicidal ideations.    The patient reports depressed mood, guilt, and hopelessness. His shame and guilt is primarily focused on his diagnosis of sex addiction and how this affects his relationship with his wife. He endorses passive suicidal ideation, but denies current intent or plan. He is able to contract for safety on the unit and does not require CO 1:1. Patient recently completed [facility] PHP and started [facility] PACE program. Further collateral needed from providers and family.     The patient requires inpatient hospitalization for stabilization and medication optimization.     Plan  Legal 9.13 voluntary  Routine observation  Continue Seroquel 200mg qHS and Luvox 200mg qHS  PRNs: Haldol 5mg PO/IM q6hr PRN, Ativan 2mg PO/IM q6hr, Benadryl 50mg PO/IM q6hr   Dispo pending stabilization

## 2021-09-09 NOTE — BH SOCIAL WORK INITIAL PSYCHOSOCIAL EVALUATION - NSBHSUBSTUSESTATEMENT_PSY_A_CORE
Post-Care Instructions: I reviewed with the patient in detail post-care instructions. Patient is to wear sunprotection, and avoid picking at any of the treated lesions. Pt may apply Vaseline to crusted or scabbing areas.
Detail Level: Detailed
.
Medical Necessity Information: It is in your best interest to select a reason for this procedure from the list below. All of these items fulfill various CMS LCD requirements except the new and changing color options.
Consent: The patient's consent was obtained including but not limited to risks of crusting, scabbing, blistering, scarring, darker or lighter pigmentary change, recurrence, incomplete removal and infection.
Medical Necessity Clause: This procedure was medically necessary because the lesions that were treated were:
Render Post-Care Instructions In Note?: no

## 2021-09-09 NOTE — BH INPATIENT PSYCHIATRY ASSESSMENT NOTE - NSBHCHARTREVIEWVS_PSY_A_CORE FT
Vital Signs Last 24 Hrs  T(C): 37.1 (09-09-21 @ 15:29), Max: 37.1 (09-09-21 @ 15:29)  T(F): 98.7 (09-09-21 @ 15:29), Max: 98.7 (09-09-21 @ 15:29)  HR: --  BP: --  BP(mean): --  RR: --  SpO2: --    Orthostatic VS  09-09-21 @ 02:00  Lying BP: --/-- HR: --  Sitting BP: 131/94 HR: 75  Standing BP: 137/91 HR: 80  Site: --  Mode: --   Vital Signs Last 24 Hrs  T(C): 36.4 (09-10-21 @ 05:51), Max: 37.1 (09-09-21 @ 15:29)  T(F): 97.6 (09-10-21 @ 05:51), Max: 98.7 (09-09-21 @ 15:29)  HR: 77 (09-10-21 @ 05:51) (77 - 77)  BP: 105/62 (09-10-21 @ 05:51) (105/62 - 105/62)  BP(mean): --  RR: --  SpO2: --    Orthostatic VS  09-09-21 @ 02:00  Lying BP: --/-- HR: --  Sitting BP: 131/94 HR: 75  Standing BP: 137/91 HR: 80  Site: --  Mode: --

## 2021-09-09 NOTE — BH SOCIAL WORK INITIAL PSYCHOSOCIAL EVALUATION - NSCMSPTSTRENGTHS_PSY_ALL_CORE
Able to adapt/Expressive of emotions/Highly motivated for treatment/Physically healthy/Positive attitude/Resourceful/Supportive family

## 2021-09-09 NOTE — BH INPATIENT PSYCHIATRY ASSESSMENT NOTE - NSBHSAALC_PSY_A_CORE FT
Patient reports drinking approx. 1 beer per day. Per chart review, patient drank 2 six pack of beers on 06/30/2021 prior to an aborted suicide attempt via CO poisoning. He denies cigarette or drug use.

## 2021-09-09 NOTE — BH SOCIAL WORK INITIAL PSYCHOSOCIAL EVALUATION - OTHER PAST PSYCHIATRIC HISTORY (INCLUDE DETAILS REGARDING ONSET, COURSE OF ILLNESS, INPATIENT/OUTPATIENT TREATMENT)
Patient is a 60 y/o male, domiciled with wife, hx of prior inpatient admissions most recent at [facility] in July 2021, hx of 2 past aborted suicide attempts (contemplated shooting self with a shot gun, attempted carbon monoxide poisoning), struggled with sex addiction - pornography, currently in outpatient treatment at [facility].

## 2021-09-09 NOTE — PSYCHIATRIC REHAB INITIAL EVALUATION - NSBHPRRECOMMEND_PSY_ALL_CORE
Writer met with patient in order to orient patient to the unit, introduce patient to Psychiatric Rehabilitation Staff, department functions, and to establish a Psychiatric Rehabilitation goal. Upon interview, patient was verbal, polite and able to develop a rapport with the current writer. Patient was oriented to group programming and encouraged to attend with effect. Patient has been visible and in good behavioral control since arrival on the unit. Patient was admitted to [place] [id number] with an admitting dx of MDD in the context of worsening depressive sx and SI. Patient is able to contract for safety while on the unit. Patient currently endorses low mood, guilt, shame, sadness, poor sleep prior to admission, anxiety, racing thoughts, and passive SI (with ideas, but no intention). Patient denies current and active HI, AH/VH. Patient notes recent relapse of sex addiction (watching pornography and masturbating) and resulting interpersonal distress within his marriage to be the trigger for the current episode. Patient has no known history of violence aggression or legal issues. Patient note that childhood trauma resurfaced in outpatient therapy however, details of trauma are unclear. Patient was able to identify a Psychiatric Rehabilitation goal. Psychiatric Rehabilitation Staff will continue to engage patient daily in order to provide support and assist patient in demonstrating progress towards Psychiatric Rehabilitation goals.

## 2021-09-10 LAB
ANION GAP SERPL CALC-SCNC: 11 MMOL/L — SIGNIFICANT CHANGE UP (ref 7–14)
BUN SERPL-MCNC: 15 MG/DL — SIGNIFICANT CHANGE UP (ref 7–23)
CALCIUM SERPL-MCNC: 9.3 MG/DL — SIGNIFICANT CHANGE UP (ref 8.4–10.5)
CHLORIDE SERPL-SCNC: 103 MMOL/L — SIGNIFICANT CHANGE UP (ref 98–107)
CHOLEST SERPL-MCNC: 214 MG/DL — HIGH
CO2 SERPL-SCNC: 26 MMOL/L — SIGNIFICANT CHANGE UP (ref 22–31)
CREAT SERPL-MCNC: 1.49 MG/DL — HIGH (ref 0.5–1.3)
GLUCOSE SERPL-MCNC: 101 MG/DL — HIGH (ref 70–99)
HDLC SERPL-MCNC: 54 MG/DL — SIGNIFICANT CHANGE UP
LIPID PNL WITH DIRECT LDL SERPL: 125 MG/DL — HIGH
NON HDL CHOLESTEROL: 160 MG/DL — HIGH
POTASSIUM SERPL-MCNC: 3.9 MMOL/L — SIGNIFICANT CHANGE UP (ref 3.5–5.3)
POTASSIUM SERPL-SCNC: 3.9 MMOL/L — SIGNIFICANT CHANGE UP (ref 3.5–5.3)
SODIUM SERPL-SCNC: 140 MMOL/L — SIGNIFICANT CHANGE UP (ref 135–145)
TRIGL SERPL-MCNC: 176 MG/DL — HIGH
VIT D25+D1,25 OH+D1,25 PNL SERPL-MCNC: 76.9 PG/ML — SIGNIFICANT CHANGE UP (ref 19.9–79.3)

## 2021-09-10 PROCEDURE — 90837 PSYTX W PT 60 MINUTES: CPT

## 2021-09-10 PROCEDURE — 99232 SBSQ HOSP IP/OBS MODERATE 35: CPT

## 2021-09-10 RX ORDER — FLUVOXAMINE MALEATE 25 MG/1
150 TABLET ORAL AT BEDTIME
Refills: 0 | Status: DISCONTINUED | OUTPATIENT
Start: 2021-09-10 | End: 2021-09-12

## 2021-09-10 RX ADMIN — FLUVOXAMINE MALEATE 150 MILLIGRAM(S): 25 TABLET ORAL at 21:40

## 2021-09-10 RX ADMIN — QUETIAPINE FUMARATE 200 MILLIGRAM(S): 200 TABLET, FILM COATED ORAL at 21:40

## 2021-09-10 NOTE — BH INPATIENT PSYCHIATRY PROGRESS NOTE - NSBHMETABOLIC_PSY_ALL_CORE_FT
BMI: BMI (kg/m2): 30.4 (09-09-21 @ 02:00)  HbA1c: A1C with Estimated Average Glucose Result: 5.3 % (09-09-21 @ 10:59)  BP: 105/62 (09-10-21 @ 05:51) (105/62 - 105/62)  Lipid Panel: Date/Time: 09-10-21 @ 09:47  Cholesterol, Serum: 214  HDL Cholesterol, Serum: 54  Triglycerides, Serum: 176   BMI: BMI (kg/m2): 30.4 (09-09-21 @ 02:00)  HbA1c: A1C with Estimated Average Glucose Result: 5.3 % (09-09-21 @ 10:59)    Glucose:   BP: 105/62 (09-10-21 @ 05:51) (105/62 - 105/62)  Lipid Panel: Date/Time: 09-10-21 @ 09:47  Cholesterol, Serum: 214  Direct LDL: --  HDL Cholesterol, Serum: 54  Total Cholesterol/HDL Ration Measurement: --  Triglycerides, Serum: 176

## 2021-09-10 NOTE — CHART NOTE - NSCHARTNOTEFT_GEN_A_CORE
Screening Medical Evaluation  Patient Admitted from: [facility] admitting diagnosis: Major depressive disorder with single episode    PAST MEDICAL & SURGICAL HISTORY:  MDD (major depressive disorder)          Allergies    No Known Allergies    Intolerances        Social History:     FAMILY HISTORY:      MEDICATIONS  (STANDING):  fluvoxaMINE 200 milliGRAM(s) Oral at bedtime  QUEtiapine 200 milliGRAM(s) Oral at bedtime    MEDICATIONS  (PRN):  acetaminophen   Tablet .. 650 milliGRAM(s) Oral every 6 hours PRN Temp greater or equal to 38C (100.4F), Mild Pain (1 - 3), Moderate Pain (4 - 6)  diphenhydrAMINE 50 milliGRAM(s) Oral every 6 hours PRN Agitation  diphenhydrAMINE   Injectable 50 milliGRAM(s) IntraMuscular once PRN Agitation  haloperidol     Tablet 5 milliGRAM(s) Oral every 6 hours PRN Agitation  haloperidol    Injectable 5 milliGRAM(s) IntraMuscular once PRN Agitation  LORazepam     Tablet 2 milliGRAM(s) Oral every 6 hours PRN Anxiety  LORazepam   Injectable 2 milliGRAM(s) IntraMuscular once PRN Agitation      Vital Signs Last 24 Hrs  T(C): 36.4 (10 Sep 2021 05:51), Max: 37.1 (09 Sep 2021 15:29)  T(F): 97.6 (10 Sep 2021 05:51), Max: 98.7 (09 Sep 2021 15:29)  HR: 77 (10 Sep 2021 05:51) (77 - 77)  BP: 105/62 (10 Sep 2021 05:51) (105/62 - 105/62)  BP(mean): --  RR: --  SpO2: --  CAPILLARY BLOOD GLUCOSE            PHYSICAL EXAM:  GENERAL: NAD, well-developed  HEAD:  Atraumatic, Normocephalic  EYES: EOMI, PERRLA, conjunctiva and sclera clear  NECK: Supple.  CHEST/LUNG: Clear to auscultation bilaterally; No wheeze  HEART: Regular rate and rhythm; No murmurs, rubs, or gallops  ABDOMEN: Soft, Nontender, Nondistended; Bowel sounds present  EXTREMITIES:  2+ Peripheral Pulses, No clubbing, cyanosis, or edema  PSYCH: AAOx3  NEUROLOGY: non-focal  SKIN: No rashes or lesions    LABS:                        16.6   5.03  )-----------( 247      ( 09 Sep 2021 10:59 )             49.1     09-09    139  |  100  |  13  ----------------------------<  96  3.1<L>   |  24  |  1.32<H>    Ca    9.2      09 Sep 2021 10:59    TPro  7.3  /  Alb  4.6  /  TBili  0.7  /  DBili  x   /  AST  19  /  ALT  22  /  AlkPhos  56  09-09              RADIOLOGY & ADDITIONAL TESTS:    Assessment and Plan:  61 year old male presenting today from [facility] ED to [facility] with admitting diagnosis of Major depressive disorder with single episode with no pertinent PMH. Denies any fever, chills, headache, chest pain, SOB, abdominal pain, N/V/D/C, dysuria. Physical exam unremarkable.   1) Major depressive disorder with single episode: Follow care plan as per primary team.

## 2021-09-10 NOTE — BH INPATIENT PSYCHIATRY PROGRESS NOTE - NSBHCHARTREVIEWVS_PSY_A_CORE FT
Vital Signs Last 24 Hrs  T(C): 36.4 (09-10-21 @ 05:51), Max: 37.1 (09-09-21 @ 15:29)  T(F): 97.6 (09-10-21 @ 05:51), Max: 98.7 (09-09-21 @ 15:29)  HR: 77 (09-10-21 @ 05:51) (77 - 77)  BP: 105/62 (09-10-21 @ 05:51) (105/62 - 105/62)    Orthostatic VS  09-09-21 @ 02:00  Sitting BP: 131/94 HR: 75  Standing BP: 137/91 HR: 80 Vital Signs Last 24 Hrs  T(C): 36.4 (09-10-21 @ 05:51), Max: 37.1 (09-09-21 @ 15:29)  T(F): 97.6 (09-10-21 @ 05:51), Max: 98.7 (09-09-21 @ 15:29)  HR: 77 (09-10-21 @ 05:51) (77 - 77)  BP: 105/62 (09-10-21 @ 05:51) (105/62 - 105/62)  BP(mean): --  RR: --  SpO2: --    Orthostatic VS  09-09-21 @ 02:00  Lying BP: --/-- HR: --  Sitting BP: 131/94 HR: 75  Standing BP: 137/91 HR: 80  Site: --  Mode: --

## 2021-09-11 DIAGNOSIS — R45.851 SUICIDAL IDEATIONS: ICD-10-CM

## 2021-09-11 PROCEDURE — 99232 SBSQ HOSP IP/OBS MODERATE 35: CPT

## 2021-09-11 RX ADMIN — QUETIAPINE FUMARATE 200 MILLIGRAM(S): 200 TABLET, FILM COATED ORAL at 20:51

## 2021-09-11 RX ADMIN — FLUVOXAMINE MALEATE 150 MILLIGRAM(S): 25 TABLET ORAL at 20:51

## 2021-09-11 NOTE — BH INPATIENT PSYCHIATRY PROGRESS NOTE - NSBHCHARTREVIEWVS_PSY_A_CORE FT
Vital Signs Last 24 Hrs  T(C): 37.1 (09-11-21 @ 16:03), Max: 37.1 (09-11-21 @ 16:03)  T(F): 98.7 (09-11-21 @ 16:03), Max: 98.7 (09-11-21 @ 16:03)    09-11-21 @ 05:41  Lying BP: 116/80 HR: 80

## 2021-09-11 NOTE — BH INPATIENT PSYCHIATRY PROGRESS NOTE - NSBHMETABOLIC_PSY_ALL_CORE_FT
BMI: BMI (kg/m2): 30.4 (09-09-21 @ 02:00)  HbA1c: A1C with Estimated Average Glucose Result: 5.3 % (09-09-21 @ 10:59)   BP: 105/62 (09-10-21 @ 05:51) (105/62 - 105/62)  Lipid Panel: Date/Time: 09-10-21 @ 09:47  Cholesterol, Serum: 214  HDL Cholesterol, Serum: 54  Triglycerides, Serum: 176

## 2021-09-12 PROCEDURE — 99232 SBSQ HOSP IP/OBS MODERATE 35: CPT

## 2021-09-12 RX ORDER — CLOMIPRAMINE HYDROCHLORIDE 50 MG/1
25 CAPSULE ORAL AT BEDTIME
Refills: 0 | Status: DISCONTINUED | OUTPATIENT
Start: 2021-09-12 | End: 2021-09-14

## 2021-09-12 RX ORDER — FLUVOXAMINE MALEATE 25 MG/1
100 TABLET ORAL AT BEDTIME
Refills: 0 | Status: DISCONTINUED | OUTPATIENT
Start: 2021-09-12 | End: 2021-09-14

## 2021-09-12 RX ADMIN — CLOMIPRAMINE HYDROCHLORIDE 25 MILLIGRAM(S): 50 CAPSULE ORAL at 20:34

## 2021-09-12 RX ADMIN — QUETIAPINE FUMARATE 200 MILLIGRAM(S): 200 TABLET, FILM COATED ORAL at 20:34

## 2021-09-12 RX ADMIN — FLUVOXAMINE MALEATE 100 MILLIGRAM(S): 25 TABLET ORAL at 20:34

## 2021-09-12 NOTE — BH INPATIENT PSYCHIATRY PROGRESS NOTE - NSBHCHARTREVIEWVS_PSY_A_CORE FT
Vital Signs Last 24 Hrs  T(C): 37.1 (09-12-21 @ 15:43), Max: 37.1 (09-12-21 @ 15:43)  T(F): 98.8 (09-12-21 @ 15:43), Max: 98.8 (09-12-21 @ 15:43)  RR: 18 (09-12-21 @ 05:22) (18 - 18)    09-12-21 @ 05:22  Lying BP: 122/71 HR: 80

## 2021-09-12 NOTE — BH INPATIENT PSYCHIATRY PROGRESS NOTE - NSBHMETABOLIC_PSY_ALL_CORE_FT
BMI: BMI (kg/m2): 30.4 (09-09-21 @ 02:00)  HbA1c: A1C with Estimated Average Glucose Result: 5.3 % (09-09-21 @ 10:59)  BP: 105/62 (09-10-21 @ 05:51) (105/62 - 105/62)  Lipid Panel: Date/Time: 09-10-21 @ 09:47  Cholesterol, Serum: 214  Direct LDL: --  HDL Cholesterol, Serum: 54  Total Cholesterol/HDL Ration Measurement: --  Triglycerides, Serum: 176

## 2021-09-13 PROCEDURE — 90832 PSYTX W PT 30 MINUTES: CPT

## 2021-09-13 PROCEDURE — 99232 SBSQ HOSP IP/OBS MODERATE 35: CPT

## 2021-09-13 RX ADMIN — FLUVOXAMINE MALEATE 100 MILLIGRAM(S): 25 TABLET ORAL at 20:50

## 2021-09-13 RX ADMIN — QUETIAPINE FUMARATE 200 MILLIGRAM(S): 200 TABLET, FILM COATED ORAL at 20:50

## 2021-09-13 RX ADMIN — CLOMIPRAMINE HYDROCHLORIDE 25 MILLIGRAM(S): 50 CAPSULE ORAL at 20:50

## 2021-09-13 NOTE — BH INPATIENT PSYCHIATRY PROGRESS NOTE - NSBHMETABOLIC_PSY_ALL_CORE_FT
BMI: BMI (kg/m2): 30.4 (09-09-21 @ 02:00)  HbA1c: A1C with Estimated Average Glucose Result: 5.3 % (09-09-21 @ 10:59)  Lipid Panel: Date/Time: 09-10-21 @ 09:47  Cholesterol, Serum: 214  HDL Cholesterol, Serum: 54  Triglycerides, Serum: 176

## 2021-09-13 NOTE — BH INPATIENT PSYCHIATRY PROGRESS NOTE - NSBHCHARTREVIEWVS_PSY_A_CORE FT
Vital Signs Last 24 Hrs  T(C): 36.9 (09-13-21 @ 08:03), Max: 36.9 (09-13-21 @ 08:03)  T(F): 98.4 (09-13-21 @ 08:03), Max: 98.4 (09-13-21 @ 08:03)    Orthostatic VS  09-13-21 @ 08:03  Lying BP: 118/71 HR: 78  Sitting BP: 134/81 HR: 92

## 2021-09-14 PROCEDURE — 99232 SBSQ HOSP IP/OBS MODERATE 35: CPT

## 2021-09-14 RX ORDER — FLUVOXAMINE MALEATE 25 MG/1
50 TABLET ORAL AT BEDTIME
Refills: 0 | Status: DISCONTINUED | OUTPATIENT
Start: 2021-09-14 | End: 2021-09-15

## 2021-09-14 RX ORDER — CLOMIPRAMINE HYDROCHLORIDE 50 MG/1
50 CAPSULE ORAL AT BEDTIME
Refills: 0 | Status: DISCONTINUED | OUTPATIENT
Start: 2021-09-14 | End: 2021-09-15

## 2021-09-14 RX ADMIN — QUETIAPINE FUMARATE 200 MILLIGRAM(S): 200 TABLET, FILM COATED ORAL at 21:03

## 2021-09-14 RX ADMIN — FLUVOXAMINE MALEATE 50 MILLIGRAM(S): 25 TABLET ORAL at 21:03

## 2021-09-14 RX ADMIN — CLOMIPRAMINE HYDROCHLORIDE 50 MILLIGRAM(S): 50 CAPSULE ORAL at 21:42

## 2021-09-14 NOTE — BH INPATIENT PSYCHIATRY DISCHARGE NOTE - HPI (INCLUDE ILLNESS QUALITY, SEVERITY, DURATION, TIMING, CONTEXT, MODIFYING FACTORS, ASSOCIATED SIGNS AND SYMPTOMS)
Patient is a 61-year-old male domiciled with wife, employed as a  with [employer] of [employer], 3 past inpatient hospitalizations, most recent on [facility] L4 (07/15/21-07/26/21), in outpatient treatment with Dr. Rivas at [facility] PACE program (started 08/30/21, completed [facility] PHP on 08/27/21), pt also in 12 step [facility] for sex addiction (watching pornography and masturbating), reports of 2 past aborted SA (contemplating shooting himself with a shot gun 04/21 and self-aborted carbon monoxide poisoning on 06/30/21), no reported hx of violence/aggression/legal issues, social Etoh use, denies other past and current substance use, no PMH, who was BIB son to [facility] ED for worsening depression and suicidal ideations.    Per [facility] ED BH assessment: “During the exam, the patient is alert and oriented x3, calm and cooperative. On exam, he complains of depressed mood, sadness, fatigue, poor concentration, insomnia, feeling guilt, anhedonia, and impairment in his functioning. States his current medication regimen is not helpful, he wishes not to live anymore, constantly thinking to end his life. Reports life is not worth any more, continues to endorses hopelessness and poor self-esteem. Patient reports he attempted suicide in June by CO poisoning. Reports he connected the hose to his car for CO poisoning. Also states recently he was in [place], went to [employer] parking [employer] and bought rope to hang himself, but changed his mind. Denies any homicidal ideation. Denies any visual or auditory hallucinations” Patient is a 61-year-old male domiciled with wife, employed as a  with [employer] of [employer], 3 past inpatient hospitalizations, most recent on [facility] L4 (07/15/21-07/26/21), in outpatient treatment with Dr. Rivas at [facility] PACE program (started 08/30/21, completed [facility] PHP on 08/27/21), pt also in 12 step [facility] for sex addiction (watching pornography and masturbating), reports of 2 past aborted SA (contemplating shooting himself with a shot gun 04/21 and self-aborted carbon monoxide poisoning on 06/30/21), no reported hx of violence/aggression/legal issues, social Etoh use, denies other past and current substance use, no PMH, who was BIB son to [facility] ED for worsening depression and suicidal ideations.    Per [facility] ED BH assessment: “During the exam, the patient is alert and oriented x3, calm and cooperative. On exam, he complains of depressed mood, sadness, fatigue, poor concentration, insomnia, feeling guilt, anhedonia, and impairment in his functioning. States his current medication regimen is not helpful, he wishes not to live anymore, constantly thinking to end his life. Reports life is not worth any more, continues to endorse hopelessness and poor self-esteem. Patient reports he attempted suicide in June by CO poisoning. Reports he connected the hose to his car for CO poisoning. Also states recently he was in [place], went to [employer] parking [employer] and bought rope to hang himself, but changed his mind. Denies any homicidal ideation. Denies any visual or auditory hallucinations”

## 2021-09-14 NOTE — BH INPATIENT PSYCHIATRY DISCHARGE NOTE - REASON FOR ADMISSION
61-year-old male with PPH of MDD, 3 past inpatient hospitalizations, most recent on [facility] L4 (07/15/21-07/26/21), in outpatient treatment with Dr. Rivas at [facility] PACE program (started 08/30/21, completed [facility] PHP on 08/27/21), BIB son to [facility] ED for worsening depression and suicidal ideations and susequently admitted to [facility].

## 2021-09-14 NOTE — BH INPATIENT PSYCHIATRY DISCHARGE NOTE - NSDCPROCEDURESFT_PSY_ALL_CORE
COVID-19 PCR: NotDetec (22 Jul 2021 14:25)  COVID-19 PCR: NotDetec (20 Jul 2021 13:13)  COVID-19 PCR: NotDetec (15 Jul 2021 00:48)

## 2021-09-14 NOTE — BH INPATIENT PSYCHIATRY DISCHARGE NOTE - NSDCCPCAREPLAN_GEN_ALL_CORE_FT
PRINCIPAL DISCHARGE DIAGNOSIS  Diagnosis: MDD (major depressive disorder)  Assessment and Plan of Treatment:        PRINCIPAL DISCHARGE DIAGNOSIS  Diagnosis: MDD (major depressive disorder)  Assessment and Plan of Treatment: Continue current medications and follow-up with your oupatient provider

## 2021-09-14 NOTE — BH INPATIENT PSYCHIATRY DISCHARGE NOTE - NSBHDCHANDOFFFT_PSY_ALL_CORE
Patient has an outpatient appointment at the [facility] on 09/23/21 and email sent to Dr. Braga as per protocol. Writer (Dr. Rivera) can be contacted at [phone] for questions related to patient's hospital course and treatment.

## 2021-09-14 NOTE — BH INPATIENT PSYCHIATRY DISCHARGE NOTE - HOSPITAL COURSE
On arrival to [facility], the patient reports that his depression began in early 2021 when he restarted watching pornography after not doing so for over 5 years. He reports significant shame and guilt around his “sex addiction” and has had much conflict with his wife regarding the issue. He initially began having suicidal ideation in April 2021 after delving into severe traumas he experienced as a child in therapy. Since then, he has contemplated suicide via shooting himself and he attempted suicide via CO poisoning but self-aborted the attempt. He no longer has any intent or plan to commit suicide but continues to have passive SI, which prompted his visit to the ER today. 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 He has been researching ECT but is unsure whether he is candidate. He is open to discussing this option as well as any medication changes. He denies any symptoms consistent with darwin or psychosis. On arrival to the inpatient unit, patient reported that his depression began in early 2021 when he restarted watching pornography after not doing so for over 5 years. He reported significant shame and guilt around his “sex addiction” and has had much conflict with his wife regarding the issue. He described a pattern of lapsing into sexual behavior followed by confession to his wife and ensuing conflict often culminating in her wanting to separate. This has been going on for about the last 20+ years but there have been long periods of calm until this year when he has been hospitalized numerous times in the past 6 months after suicidal plans. He reports having suicidal ideation in April 2021 after delving into severe traumas he experienced as a child in therapy. Since then, he has contemplated suicide via shooting himself and he attempted suicide via CO poisoning but self-aborted the attempt. He no longer endorsed any intent or plan to commit suicide but continued to have passive SI, which prompted his visit to the ER. 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 He is also comforted that at this point his wife wants to remain  and is supportive. He denied any symptoms consistent with darwin or psychosis. Patient had been maintained on Luvox and Seroquel for a while. Luvox was tapered and discontinued and Clomipramine was initiated and gradually titrated up to better address mood and obsessive thoughts. Home quetiapine was continued. Patient also participated in group and individual therapy to explore, rehearse and reinforce solution-oriented behavior regarding feelings of guilt, suicidal impulses and negative interactions with his wife. Patient’s symptoms have improved significantly on his current management and he is feels hopeful about his future. Additionally, he has maximized his treatment in an inpatient setting, and is psychiatrically stable to continue his treatment in an outpatient setting. Patient's case was discussed with all members of interdisciplinary team and when everyone agreed that patient was stable and appropriate for discharge, he was discharged home with follow-up appointments. We also encouraged couples therapy after discharge. Prior to discharge, patient was provided with a 30-day supply of medications, and the importance of treatment compliance was reiterated. Patient verbalized understanding of the matter and agreed with his discharge plans. Patient was instructed on actions for crisis situations, understood and agreed to follow instructions for handling crisis, including coming to ER or calling 911.  Psychotropic medications on discharge: clomipramine 100mg at bedtime and quetiapine 200mg at bedtime.

## 2021-09-14 NOTE — BH INPATIENT PSYCHIATRY DISCHARGE NOTE - NSDCMRMEDTOKEN_GEN_ALL_CORE_FT
fluvoxaMINE 100 mg oral tablet: 2 tab(s) orally once a day  QUEtiapine 300 mg oral tablet, extended release: 1 tab(s) orally once a day (in the evening)

## 2021-09-14 NOTE — BH INPATIENT PSYCHIATRY DISCHARGE NOTE - OTHER PAST PSYCHIATRIC HISTORY (INCLUDE DETAILS REGARDING ONSET, COURSE OF ILLNESS, INPATIENT/OUTPATIENT TREATMENT)
MDD, sex addiction (pornography)  3 past inpatient hospitalizations, [facility] 04/2021, [facility] in [place] (07/2021), [facility] L4 (07/15/2021 – 07/26/2021)  Completed [facility] PHP (07/29/2021 – 08/27/2021), currently in [facility] PACE program with Dr. Rivas (started 08/30/2021), also reportedly in 12 step [facility] for sex addiction  Prescribed Seroquel 200mg qHS and Luvox 200mg qHS per PACE admit note from Dr. Rivas on 08/26/21  Reports of 2 past aborted SA (contemplating shooting himself with a shot gun 04/21 and self-aborted carbon monoxide poisoning on 06/30/21), also reported contemplating hanging (bought rope) to [facility]

## 2021-09-14 NOTE — BH INPATIENT PSYCHIATRY PROGRESS NOTE - NSBHCHARTREVIEWVS_PSY_A_CORE FT
Vital Signs Last 24 Hrs  T(C): 36.9 (09-13-21 @ 16:07), Max: 36.9 (09-13-21 @ 16:07)  T(F): 98.5 (09-13-21 @ 16:07), Max: 98.5 (09-13-21 @ 16:07)    Orthostatic VS  09-13-21 @ 08:03  Lying BP: 118/71 HR: 78  Sitting BP: 134/81 HR: 92

## 2021-09-15 PROCEDURE — 90832 PSYTX W PT 30 MINUTES: CPT

## 2021-09-15 PROCEDURE — 99232 SBSQ HOSP IP/OBS MODERATE 35: CPT

## 2021-09-15 RX ORDER — CLOMIPRAMINE HYDROCHLORIDE 50 MG/1
75 CAPSULE ORAL AT BEDTIME
Refills: 0 | Status: DISCONTINUED | OUTPATIENT
Start: 2021-09-15 | End: 2021-09-16

## 2021-09-15 RX ADMIN — QUETIAPINE FUMARATE 200 MILLIGRAM(S): 200 TABLET, FILM COATED ORAL at 20:40

## 2021-09-15 RX ADMIN — CLOMIPRAMINE HYDROCHLORIDE 75 MILLIGRAM(S): 50 CAPSULE ORAL at 20:40

## 2021-09-15 NOTE — BH PSYCHOLOGY - CLINICIAN PSYCHOTHERAPY NOTE - NSBHPSYCHOLINT_PSY_A_CORE
Cognitive/behavioral therapy/Encourage medication compliance/Supported coping skills/Supportive therapy/Treatment compliance encouraged
Cognitive/behavioral therapy/Encourage medication compliance/Problem-solving techniques discussed/Supportive therapy/Treatment compliance encouraged
Cognitive/behavioral therapy/Encourage medication compliance/Problem-solving techniques discussed/Treatment compliance encouraged

## 2021-09-15 NOTE — BH DISCHARGE NOTE NURSING/SOCIAL WORK/PSYCH REHAB - DISCHARGE INSTRUCTIONS AFTERCARE APPOINTMENTS
In order to check the location, date, or time of your aftercare appointment, please refer to your Discharge Instructions Document given to you upon leaving the hospital.  If you have lost the instructions please call [phone]

## 2021-09-15 NOTE — BH PSYCHOLOGY - CLINICIAN PSYCHOTHERAPY NOTE - NSBHPSYCHOLNARRATIVE_PSY_A_CORE FT
Pt was seen individually for depression at the team’s request and with his consent. He accompanied the writer onto the patio to talk. He was dressed casually and was alert, oriented and cooperative. Eye contact was good and speech was of normal rate and volume. Mood was anxious and depressed and affect was of normal range and moderately intense. Thought process was logical and organized and without peculiarities. He denied current suicidality. He is looking forward to being discharged on Friday (9/17/21).    Session focused on coping with suicidal impulses and on his interactions with his wife which often provoke such crises. Writer reviewed some of the coping tools the patient had learned while in the PHP. In discussing the uncomfortable interchanges with his wife, the patient identified feeling “upset” with her increased questioning of his behavior and the truth of his disclosures. This leads to anger which he tries to avoid expressing to avoid escalation. Several suggested coping responses were raised including postponing his reaction by a set time interval (e.g. 10 minutes) and then re-evaluating; discussing “time-outs” beforehand that either of them could invoke if their distress level exceeded 7 out of 10; practicing empathic reflection (this was modeled and rehearsed). Patient participated in developing these planned responses and agreed to try to implement them and to discuss with his wife. Patient expressed benefit from the session and agreed to meet again.
Pt was seen individually for depression at the team’s request and with his consent. He was dressed casually and was alert, oriented and cooperative. Eye contact was good and speech was of normal rate and volume. Mood was anxious and depressed and affect was of normal range and moderately intense. Thought process was logical and he spoke with attention to detail, reporting events and dates with precision. HE denied current suicidality but described recent plans and an attempt.   Session focused on his history of depression and sex addiction, events leading to his hospitalization, continued anxiety and guilt related to his wife’s thoughts about separation and prior treatments received. He currently reports some improvement in his symptoms but is feeling guilty and anxious related to past behavior. He described a pattern of lapsing into sexual behavior followed by confession to his wife and ensuing conflict often culminating in her wanting to separate. This has been going on for about the last 20+ years but there have been long periods of calm until this year when he has been hospitalized numerous times in the past 6 months after suicidal plans. He is comforted that at this point his wife wants to remain  and is supportive. He seemed to accept that he will not be able to permanently suppress his sexual behavior but hopes that he could find a way to avoid recent emotional decompensation and restore the stability in his marriage.   Writer provided pt with support and reflected the apparent usefulness of coping tools for managing guilt and suicidal impulses as well as couples therapy. Patient agreed and is interested in pursuing treatment. (Of note, in his recent [facility] treatment, he had learned coping skills including mindfulness, relaxation techniques, (deep breathing) and cognitive reframing of negative thoughts. Writer spoke with patient’s therapist in [facility] who summarized his progress and improvement at that time.) Patient expressed benefit from the session and agreed to meet again.
Pt was seen individually for depression at the team’s request and with his consent. He was dressed casually and was alert, oriented and cooperative. Eye contact was good and speech was of normal rate and volume. Mood was moderately anxious and depressed and affect was of normal range and moderately intense. Thought process was logical and organized and without peculiarities. He denied current suicidality. He is looking forward to being discharged on Friday (9/17/21).    Session focused on continuing to explore, rehearse and reinforce solution-oriented behavior regarding suicidal impulses and negative interactions with his wife. We reviewed the techniques and thinking discussed previously Patient reported having discussed these with his wife and inquired about specific likely scenarios that he could face which we scripted and role played. He also conveyed his wife’s interest in learning such responses and how to react to his defensiveness and we developed counterpart tools for her to try out.   Patient looks forward to discharge. Writer conveyed that the team will be looking to connect him with follow-up that can address his psychiatric needs and that we also encourage couples therapy and will try to find resources to offer him. Patient expressed benefit from the session and agreed to meet again.

## 2021-09-15 NOTE — BH DISCHARGE NOTE NURSING/SOCIAL WORK/PSYCH REHAB - NSDCADDINFO1FT_PSY_ALL_CORE
Once you have attended this appt, you will be given appt at our Outpatient Mental Health clinic where you can receive medication management and therapy

## 2021-09-15 NOTE — BH DISCHARGE NOTE NURSING/SOCIAL WORK/PSYCH REHAB - NSDCPRGOAL_PSY_ALL_CORE
Patient initially presented with symptoms of depression, anxiety and suicidal ideations. Patient's initial goals included increasing his hope in recovery, increasing his coping skills and decreasing his anxiety. Patient made gains towards his rehab goals as evidenced by patient's denial of suicidal ideations, utilization of coping skills, decreased anxiety and participation in rehab activities. Patient also demonstrated daily medication compliance. Today patient stated he is looking forward to returning home and returning to work.    ***Patient completed a safety plan and a Press Ganey survey.

## 2021-09-15 NOTE — BH DISCHARGE NOTE NURSING/SOCIAL WORK/PSYCH REHAB - PATIENT PORTAL LINK FT
You can access the FollowMyHealth Patient Portal offered by [facility] by registering at the following website: [email or web address]. By joining [facility]’s FollowMyHealth portal, you will also be able to view your health information using other applications (apps) compatible with our system. 48.3

## 2021-09-15 NOTE — BH DISCHARGE NOTE NURSING/SOCIAL WORK/PSYCH REHAB - NSDCPRRECOMMEND_PSY_ALL_CORE
Patient is scheduled to be discharged today and will return to his previous residence. Writer encouraged patient to maintain medication compliance, to utilize his coping skills and to participate in structured activities.

## 2021-09-15 NOTE — BH PSYCHOLOGY - CLINICIAN PSYCHOTHERAPY NOTE - NSBHPSYCHOLRESPONSE_PSY_A_CORE
Insight displayed/Accepted support
Coping skills acquired/Insight displayed/Accepted support
Insight displayed/Accepted support

## 2021-09-15 NOTE — BH DISCHARGE NOTE NURSING/SOCIAL WORK/PSYCH REHAB - NSBHDCADDR1FT_A_CORE
[facility]  [address] [place] [id number] [facility]  [address] [place] [id number]

## 2021-09-15 NOTE — BH DISCHARGE NOTE NURSING/SOCIAL WORK/PSYCH REHAB - NSCDUDCCRISIS_PSY_A_CORE
[facility] Well  1 [phone]) [facility]-WELL ([phone])  Text "WELL" to 35035  Website: www.[employer]/.[facility] 1 (818) 621-HOPE (2928) Website: www.safehorizon.org/.National Suicide Prevention Lifeline 5 (187) 653-9882/.  [facility]  1 (502) LIFENET (183-3346)/.  [facility] Response Crisis Hotline  (981) 980-4578  24 hour telephone crisis intervention and suicide prevention hotline concerned with all mental health issues/.  [facility]  [address] [place] [phone]   Hours:  Monday through Friday from 9 AM to 3 PM/.  [facility] [facility]  [employer] [facility] Crisis Line  6 [phone] [phone], Option 1 [facility] Well  1 (500) [facility]-WELL ([phone])  Text "WELL" to 07675  Website: www.Clipboard/.[facility] 1 (582) 331-MOPS (4535) Website: www.safehorizon.org/.National Suicide Prevention Lifeline 4 (065) 603-2225/.  [facility]  1 (950) LIFENET (502-9003)/.  [facility]’s Behavioral Health Crisis Center  [phone] [place] [phone]   Hours:  Monday through Friday from 9 AM to 3 PM/.  [employer] [facility] [facility] Crisis Line  [phone], Option 1

## 2021-09-15 NOTE — BH PSYCHOLOGY - CLINICIAN PSYCHOTHERAPY NOTE - NSTXDCOTHRGOAL_PSY_ALL_CORE
Patient will identify coping skills and report improved mood.

## 2021-09-15 NOTE — BH INPATIENT PSYCHIATRY PROGRESS NOTE - NSBHCHARTREVIEWVS_PSY_A_CORE FT
Vital Signs Last 24 Hrs  T(C): 36.6 (09-15-21 @ 05:50), Max: 37.2 (09-14-21 @ 15:51)  T(F): 97.9 (09-15-21 @ 05:50), Max: 98.9 (09-14-21 @ 15:51)    Orthostatic VS  09-15-21 @ 05:50  Sitting BP: 119/75 HR: 96  Standing BP: 100/55 HR: 124  Site: upper right arm  Mode: electronic

## 2021-09-16 PROCEDURE — 99232 SBSQ HOSP IP/OBS MODERATE 35: CPT

## 2021-09-16 RX ORDER — CLOMIPRAMINE HYDROCHLORIDE 50 MG/1
2 CAPSULE ORAL
Qty: 30 | Refills: 0
Start: 2021-09-16 | End: 2021-09-30

## 2021-09-16 RX ORDER — CLOMIPRAMINE HYDROCHLORIDE 50 MG/1
100 CAPSULE ORAL AT BEDTIME
Refills: 0 | Status: DISCONTINUED | OUTPATIENT
Start: 2021-09-16 | End: 2021-09-17

## 2021-09-16 RX ADMIN — QUETIAPINE FUMARATE 200 MILLIGRAM(S): 200 TABLET, FILM COATED ORAL at 20:15

## 2021-09-16 RX ADMIN — CLOMIPRAMINE HYDROCHLORIDE 100 MILLIGRAM(S): 50 CAPSULE ORAL at 20:15

## 2021-09-16 NOTE — BH INPATIENT PSYCHIATRY PROGRESS NOTE - NSBHCHARTREVIEWVS_PSY_A_CORE FT
Vital Signs Last 24 Hrs  T(C): 36.4 (09-16-21 @ 05:16), Max: 36.9 (09-15-21 @ 15:35)  T(F): 97.6 (09-16-21 @ 05:16), Max: 98.5 (09-15-21 @ 15:35)  RR: 18 (09-16-21 @ 05:16) (18 - 18)    Orthostatic VS  09-16-21 @ 05:16  Lying BP: 114/70 HR: 84  Site: upper left arm  Mode: electronic

## 2021-09-17 VITALS — RESPIRATION RATE: 18 BRPM | TEMPERATURE: 98 F

## 2021-09-17 PROCEDURE — 99238 HOSP IP/OBS DSCHRG MGMT 30/<: CPT

## 2021-09-17 RX ORDER — QUETIAPINE FUMARATE 200 MG/1
1 TABLET, FILM COATED ORAL
Qty: 0 | Refills: 0 | DISCHARGE
Start: 2021-09-17

## 2021-09-17 NOTE — BH INPATIENT PSYCHIATRY PROGRESS NOTE - NSTXDEPRESINTERMD_PSY_ALL_CORE
Med management

## 2021-09-17 NOTE — BH INPATIENT PSYCHIATRY PROGRESS NOTE - NSTXCOPEINTERMD_PSY_ALL_CORE
Will encourage participation in group and individual therapy to learn coping skills. 

## 2021-09-17 NOTE — BH INPATIENT PSYCHIATRY PROGRESS NOTE - CURRENT MEDICATION
MEDICATIONS  (STANDING):  clomiPRAMINE . 25 milliGRAM(s) Oral at bedtime  fluvoxaMINE 100 milliGRAM(s) Oral at bedtime  QUEtiapine 200 milliGRAM(s) Oral at bedtime    MEDICATIONS  (PRN):  acetaminophen   Tablet .. 650 milliGRAM(s) Oral every 6 hours PRN Temp greater or equal to 38C (100.4F), Mild Pain (1 - 3), Moderate Pain (4 - 6)  diphenhydrAMINE 50 milliGRAM(s) Oral every 6 hours PRN Agitation  diphenhydrAMINE   Injectable 50 milliGRAM(s) IntraMuscular once PRN Agitation  haloperidol     Tablet 5 milliGRAM(s) Oral every 6 hours PRN Agitation  haloperidol    Injectable 5 milliGRAM(s) IntraMuscular once PRN Agitation  LORazepam     Tablet 2 milliGRAM(s) Oral every 6 hours PRN Anxiety  LORazepam   Injectable 2 milliGRAM(s) IntraMuscular once PRN Agitation  
MEDICATIONS  (STANDING):  fluvoxaMINE 150 milliGRAM(s) Oral at bedtime  QUEtiapine 200 milliGRAM(s) Oral at bedtime    MEDICATIONS  (PRN):  acetaminophen   Tablet .. 650 milliGRAM(s) Oral every 6 hours PRN Temp greater or equal to 38C (100.4F), Mild Pain (1 - 3), Moderate Pain (4 - 6)  diphenhydrAMINE 50 milliGRAM(s) Oral every 6 hours PRN Agitation  diphenhydrAMINE   Injectable 50 milliGRAM(s) IntraMuscular once PRN Agitation  haloperidol     Tablet 5 milliGRAM(s) Oral every 6 hours PRN Agitation  haloperidol    Injectable 5 milliGRAM(s) IntraMuscular once PRN Agitation  LORazepam     Tablet 2 milliGRAM(s) Oral every 6 hours PRN Anxiety  LORazepam   Injectable 2 milliGRAM(s) IntraMuscular once PRN Agitation  
MEDICATIONS  (STANDING):  clomiPRAMINE . 25 milliGRAM(s) Oral at bedtime  fluvoxaMINE 100 milliGRAM(s) Oral at bedtime  QUEtiapine 200 milliGRAM(s) Oral at bedtime    MEDICATIONS  (PRN):  acetaminophen   Tablet .. 650 milliGRAM(s) Oral every 6 hours PRN Temp greater or equal to 38C (100.4F), Mild Pain (1 - 3), Moderate Pain (4 - 6)  diphenhydrAMINE 50 milliGRAM(s) Oral every 6 hours PRN Agitation  diphenhydrAMINE   Injectable 50 milliGRAM(s) IntraMuscular once PRN Agitation  haloperidol     Tablet 5 milliGRAM(s) Oral every 6 hours PRN Agitation  haloperidol    Injectable 5 milliGRAM(s) IntraMuscular once PRN Agitation  LORazepam     Tablet 2 milliGRAM(s) Oral every 6 hours PRN Anxiety  LORazepam   Injectable 2 milliGRAM(s) IntraMuscular once PRN Agitation  
MEDICATIONS  (STANDING):  clomiPRAMINE . 100 milliGRAM(s) Oral at bedtime  QUEtiapine 200 milliGRAM(s) Oral at bedtime    MEDICATIONS  (PRN):  acetaminophen   Tablet .. 650 milliGRAM(s) Oral every 6 hours PRN Temp greater or equal to 38C (100.4F), Mild Pain (1 - 3), Moderate Pain (4 - 6)  diphenhydrAMINE 50 milliGRAM(s) Oral every 6 hours PRN Agitation  diphenhydrAMINE   Injectable 50 milliGRAM(s) IntraMuscular once PRN Agitation  haloperidol     Tablet 5 milliGRAM(s) Oral every 6 hours PRN Agitation  haloperidol    Injectable 5 milliGRAM(s) IntraMuscular once PRN Agitation  LORazepam     Tablet 2 milliGRAM(s) Oral every 6 hours PRN Anxiety  LORazepam   Injectable 2 milliGRAM(s) IntraMuscular once PRN Agitation  
MEDICATIONS  (STANDING):  clomiPRAMINE . 75 milliGRAM(s) Oral at bedtime  QUEtiapine 200 milliGRAM(s) Oral at bedtime    MEDICATIONS  (PRN):  acetaminophen   Tablet .. 650 milliGRAM(s) Oral every 6 hours PRN Temp greater or equal to 38C (100.4F), Mild Pain (1 - 3), Moderate Pain (4 - 6)  diphenhydrAMINE 50 milliGRAM(s) Oral every 6 hours PRN Agitation  diphenhydrAMINE   Injectable 50 milliGRAM(s) IntraMuscular once PRN Agitation  haloperidol     Tablet 5 milliGRAM(s) Oral every 6 hours PRN Agitation  haloperidol    Injectable 5 milliGRAM(s) IntraMuscular once PRN Agitation  LORazepam     Tablet 2 milliGRAM(s) Oral every 6 hours PRN Anxiety  LORazepam   Injectable 2 milliGRAM(s) IntraMuscular once PRN Agitation  
MEDICATIONS  (STANDING):  clomiPRAMINE . 25 milliGRAM(s) Oral at bedtime  fluvoxaMINE 100 milliGRAM(s) Oral at bedtime  QUEtiapine 200 milliGRAM(s) Oral at bedtime    MEDICATIONS  (PRN):  acetaminophen   Tablet .. 650 milliGRAM(s) Oral every 6 hours PRN Temp greater or equal to 38C (100.4F), Mild Pain (1 - 3), Moderate Pain (4 - 6)  diphenhydrAMINE 50 milliGRAM(s) Oral every 6 hours PRN Agitation  diphenhydrAMINE   Injectable 50 milliGRAM(s) IntraMuscular once PRN Agitation  haloperidol     Tablet 5 milliGRAM(s) Oral every 6 hours PRN Agitation  haloperidol    Injectable 5 milliGRAM(s) IntraMuscular once PRN Agitation  LORazepam     Tablet 2 milliGRAM(s) Oral every 6 hours PRN Anxiety  LORazepam   Injectable 2 milliGRAM(s) IntraMuscular once PRN Agitation  
MEDICATIONS  (STANDING):  clomiPRAMINE . 100 milliGRAM(s) Oral at bedtime  QUEtiapine 200 milliGRAM(s) Oral at bedtime    MEDICATIONS  (PRN):  acetaminophen   Tablet .. 650 milliGRAM(s) Oral every 6 hours PRN Temp greater or equal to 38C (100.4F), Mild Pain (1 - 3), Moderate Pain (4 - 6)  diphenhydrAMINE 50 milliGRAM(s) Oral every 6 hours PRN Agitation  diphenhydrAMINE   Injectable 50 milliGRAM(s) IntraMuscular once PRN Agitation  haloperidol     Tablet 5 milliGRAM(s) Oral every 6 hours PRN Agitation  haloperidol    Injectable 5 milliGRAM(s) IntraMuscular once PRN Agitation  
MEDICATIONS  (STANDING):  fluvoxaMINE 150 milliGRAM(s) Oral at bedtime  QUEtiapine 200 milliGRAM(s) Oral at bedtime    MEDICATIONS  (PRN):  acetaminophen   Tablet .. 650 milliGRAM(s) Oral every 6 hours PRN Temp greater or equal to 38C (100.4F), Mild Pain (1 - 3), Moderate Pain (4 - 6)  diphenhydrAMINE 50 milliGRAM(s) Oral every 6 hours PRN Agitation  diphenhydrAMINE   Injectable 50 milliGRAM(s) IntraMuscular once PRN Agitation  haloperidol     Tablet 5 milliGRAM(s) Oral every 6 hours PRN Agitation  haloperidol    Injectable 5 milliGRAM(s) IntraMuscular once PRN Agitation  LORazepam     Tablet 2 milliGRAM(s) Oral every 6 hours PRN Anxiety  LORazepam   Injectable 2 milliGRAM(s) IntraMuscular once PRN Agitation

## 2021-09-17 NOTE — BH INPATIENT PSYCHIATRY PROGRESS NOTE - NSTXDCOTHRGOAL_PSY_ALL_CORE
Patient will identify coping skills and report improved mood.

## 2021-09-17 NOTE — BH INPATIENT PSYCHIATRY PROGRESS NOTE - NSBHCHARTREVIEWVS_PSY_A_CORE FT
Vital Signs Last 24 Hrs  T(C): 36.6 (09-17-21 @ 07:50), Max: 37 (09-16-21 @ 15:32)  T(F): 97.9 (09-17-21 @ 07:50), Max: 98.6 (09-16-21 @ 15:32)  RR: 18 (09-17-21 @ 07:50) (18 - 18)    Orthostatic VS  09-17-21 @ 07:50  Sitting BP: 115/75 HR: 80  Standing BP: 120/89 HR: 82  Site: upper left arm  Mode: electronic

## 2021-09-17 NOTE — BH INPATIENT PSYCHIATRY PROGRESS NOTE - NSICDXBHPRIMARYDX_PSY_ALL_CORE
MDD (major depressive disorder)   F32.9  

## 2021-09-17 NOTE — BH INPATIENT PSYCHIATRY PROGRESS NOTE - NSBHCHARTREVIEWLAB_PSY_A_CORE FT
09-10    140  |  103  |  15  ----------------------------<  101<H>  3.9   |  26  |  1.49<H>  

## 2021-09-17 NOTE — BH INPATIENT PSYCHIATRY PROGRESS NOTE - PRN MEDS
MEDICATIONS  (PRN):  acetaminophen   Tablet .. 650 milliGRAM(s) Oral every 6 hours PRN Temp greater or equal to 38C (100.4F), Mild Pain (1 - 3), Moderate Pain (4 - 6)  diphenhydrAMINE 50 milliGRAM(s) Oral every 6 hours PRN Agitation  diphenhydrAMINE   Injectable 50 milliGRAM(s) IntraMuscular once PRN Agitation  haloperidol     Tablet 5 milliGRAM(s) Oral every 6 hours PRN Agitation  haloperidol    Injectable 5 milliGRAM(s) IntraMuscular once PRN Agitation  LORazepam     Tablet 2 milliGRAM(s) Oral every 6 hours PRN Anxiety  LORazepam   Injectable 2 milliGRAM(s) IntraMuscular once PRN Agitation  
MEDICATIONS  (PRN):  acetaminophen   Tablet .. 650 milliGRAM(s) Oral every 6 hours PRN Temp greater or equal to 38C (100.4F), Mild Pain (1 - 3), Moderate Pain (4 - 6)  diphenhydrAMINE 50 milliGRAM(s) Oral every 6 hours PRN Agitation  diphenhydrAMINE   Injectable 50 milliGRAM(s) IntraMuscular once PRN Agitation  haloperidol     Tablet 5 milliGRAM(s) Oral every 6 hours PRN Agitation  haloperidol    Injectable 5 milliGRAM(s) IntraMuscular once PRN Agitation  
MEDICATIONS  (PRN):  acetaminophen   Tablet .. 650 milliGRAM(s) Oral every 6 hours PRN Temp greater or equal to 38C (100.4F), Mild Pain (1 - 3), Moderate Pain (4 - 6)  diphenhydrAMINE 50 milliGRAM(s) Oral every 6 hours PRN Agitation  diphenhydrAMINE   Injectable 50 milliGRAM(s) IntraMuscular once PRN Agitation  haloperidol     Tablet 5 milliGRAM(s) Oral every 6 hours PRN Agitation  haloperidol    Injectable 5 milliGRAM(s) IntraMuscular once PRN Agitation  LORazepam     Tablet 2 milliGRAM(s) Oral every 6 hours PRN Anxiety  LORazepam   Injectable 2 milliGRAM(s) IntraMuscular once PRN Agitation  

## 2021-09-17 NOTE — BH INPATIENT PSYCHIATRY PROGRESS NOTE - NSTXDEPRESGOAL_PSY_ALL_CORE
Report using a coping skill to overcome sadness and worry in order to socialize with peers daily

## 2021-09-17 NOTE — BH INPATIENT PSYCHIATRY PROGRESS NOTE - NSTXCOPEGOAL_PSY_ALL_CORE
Identify and utilize 2 coping skills that meet their needs

## 2021-09-17 NOTE — BH INPATIENT PSYCHIATRY PROGRESS NOTE - MSE UNSTRUCTURED FT
The patient appears stated age, fair hygiene and dressed appropriately. The patient is calm and cooperative with the interview and maintains appropriate eye contact. No psychomotor agitation or retardation noted. Steady gait observed. The patient's speech is fluent, normal in tone, rate, and volume. The patient's mood is "much better". His affect is neutral. Thought process is goal directed. No delusional content. No SI/HI today. No perceptual disturbance noted. Cognition AAOx3. Insight is fair. Judgment is fair. Impulse control intact at the time of exam.
The patient appears stated age, fair hygiene and dressed appropriately. The patient is calm and cooperative with the interview and maintains appropriate eye contact. No psychomotor agitation or retardation noted. Steady gait observed. The patient's speech is fluent, normal in tone, rate, and volume. The patient's mood is "better". His affect is neutral. Thought process is goal directed. No delusional content. No SI/HI today. No perceptual disturbance noted. Cognition AAOx3. Insight is fair. Judgment is fair. Impulse control intact at the time of exam.
The patient appears stated age, fair hygiene and dressed appropriately. The patient is calm and cooperative with the interview and maintains appropriate eye contact. No psychomotor agitation or retardation noted. Steady gait observed. The patient's speech is fluent, normal in tone, rate, and volume. The patient's mood is "better". His affect is neutral. Thought process is goal directed. No delusional content. No SI/HI today. No perceptual disturbance noted. Cognition AAOx3. Insight is fair. Judgment is fair. Impulse control intact at the time of exam.
The patient appears stated age, fair hygiene and dressed appropriately. The patient is calm and cooperative with the interview and maintains appropriate eye contact. No psychomotor agitation or retardation noted. Steady gait observed. The patient's speech is fluent, normal in tone, rate, and volume. The patient's mood is "good". His affect is congruent with good range. Thought process is goal directed. No delusional content. No SI/HI today. No perceptual disturbance noted. Cognition AAOx3. Insight is fair. Judgment is fair. Impulse control intact at the time of exam.
The patient appears stated age, fair hygiene and dressed appropriately. The patient is calm and cooperative with the interview and maintains appropriate eye contact. No psychomotor agitation or retardation noted. Steady gait observed. The patient's speech is fluent, normal in tone, rate, and volume. The patient's mood is "sad". His affect is dysphoric/ anxious. Thought process is goal directed, overinclusive at times. No delusional content. Endorses passive SI, denies intent or plan. No perceptual disturbance noted. Cognition AAOx3. Insight is fair. Judgment is fair. Impulse control intact at the time of exam.
The patient appears stated age, fair hygiene and dressed appropriately. The patient is calm and cooperative with the interview and maintains appropriate eye contact. No psychomotor agitation or retardation noted. Steady gait observed. The patient's speech is fluent, normal in tone, rate, and volume. The patient's mood is "not good". His affect is dysphoric/ anxious. Thought process is goal directed, overinclusive at times. No delusional content. Endorses passive SI, denies intent or plan. No perceptual disturbance noted. Cognition AAOx3. Insight is fair. Judgment is fair. Impulse control intact at the time of exam.
The patient appears stated age, fair hygiene and dressed appropriately. The patient is calm and cooperative with the interview and maintains appropriate eye contact. No psychomotor agitation or retardation noted. Steady gait observed. The patient's speech is fluent, normal in tone, rate, and volume. The patient's mood is "okay". His affect is neutral. Thought process is goal directed. No delusional content. No SI/HI today. No perceptual disturbance noted. Cognition AAOx3. Insight is fair. Judgment is fair. Impulse control intact at the time of exam.
The patient appears stated age, fair hygiene and dressed appropriately. The patient is calm and cooperative with the interview and maintains appropriate eye contact. No psychomotor agitation or retardation noted. Steady gait observed. The patient's speech is fluent, normal in tone, rate, and volume. The patient's mood is "sad and anxious". His affect is dysphoric/ anxious. Thought process is goal directed, overinclusive at times. No delusional content. Endorses passive SI, denies intent or plan. No perceptual disturbance noted. Cognition AAOx3. Insight is fair. Judgment is fair. Impulse control intact at the time of exam.

## 2021-09-17 NOTE — BH INPATIENT PSYCHIATRY PROGRESS NOTE - NSICDXBHSECONDARYDX_PSY_ALL_CORE
Suicidal thoughts   D68.085  
Suicidal thoughts   W36.250  
Suicidal thoughts   L82.726  
Suicidal thoughts   Y40.166  
Suicidal thoughts   D16.321  
Suicidal thoughts   W85.779  
Suicidal thoughts   T41.171

## 2021-09-17 NOTE — BH INPATIENT PSYCHIATRY PROGRESS NOTE - NSTXDCOTHRINTERMD_PSY_ALL_CORE
Will coordinate discharge with patient and family

## 2021-09-17 NOTE — BH INPATIENT PSYCHIATRY PROGRESS NOTE - NSBHFUPINTERVALCCFT_PSY_A_CORE
"I'm looking forward to going home and starting my life again"
"I really want to leave as soon as possible"
"I don't know what to do"
"I feel okay"
"I'm feel so much better"
"I'm looking forward to going home"
"I feel a little better today"
"I really want to get out of here"

## 2021-09-17 NOTE — BH INPATIENT PSYCHIATRY PROGRESS NOTE - NSBHASSESSSUMMFT_PSY_ALL_CORE
Patient is a 60 y/o male domiciled with wife, employed as a  with [employer], 3 past inpatient hospitalizations, most recent on [facility] L4 (07/15/21-07/26/21), in outpatient treatment with Dr. Rivas at [facility] PACE program (started 08/30/21, completed [facility] PHP on 08/27/21), pt also in 12 step [facility] for sex addiction (watching pornography and masturbating), reports of 2 past aborted SA (contemplating shooting himself with a shot gun 04/21 and self-aborted carbon monoxide poisoning on 06/30/21), no reported hx of violence/aggression/legal issues, social Etoh use, denies other past and current substance use, no PMH, who was BIB son to [facility] ED for worsening depression and suicidal ideations.    On exam, patient still reports depressed mood, guilt, and hopelessness, is discharge focused, still endorsing passive suicidal ideation, but denies current intent or plan. Minimizing circumstances leading up to hospitalization.   Plan: Will continue to taper and d/c Luvox. Will initiate Clomipramine 25mg at bedtime starting Sunday 09/12. Will continue rest of the current medications. 
Patient is a 62 y/o male domiciled with wife, employed as a  with [employer], 3 past inpatient hospitalizations, most recent on [facility] L4 (07/15/21-07/26/21), in outpatient treatment with Dr. Rivas at [facility] PACE program (started 08/30/21, completed [facility] PHP on 08/27/21), pt also in 12 step [facility] for sex addiction (watching pornography and masturbating), reports of 2 past aborted SA (contemplating shooting himself with a shot gun 04/21 and self-aborted carbon monoxide poisoning on 06/30/21), no reported hx of violence/aggression/legal issues, social Etoh use, denies other past and current substance use, no PMH, who was BIB son to [facility] ED for worsening depression and suicidal ideations.    On exam, patient with some improvement in mood, remains discharge focused, no longer endorsing suicidal ideation, taking meds.  Plan: Will continue to taper and d/c Luvox. Will continue rest of the current medications with a plan to titrate Clomipramine as tolerated. 
Patient is a 60 y/o male domiciled with wife, employed as a  with [employer], 3 past inpatient hospitalizations, most recent on [facility] L4 (07/15/21-07/26/21), in outpatient treatment with Dr. Rivas at [facility] PACE program (started 08/30/21, completed [facility] PHP on 08/27/21), pt also in 12 step [facility] for sex addiction (watching pornography and masturbating), reports of 2 past aborted SA (contemplating shooting himself with a shot gun 04/21 and self-aborted carbon monoxide poisoning on 06/30/21), no reported hx of violence/aggression/legal issues, social Etoh use, denies other past and current substance use, no PMH, who was BIB son to [facility] ED for worsening depression and suicidal ideations.    On exam, patient with improved mood, is looking forward to going home today, feels hopeful about the future, tolerating Clomipramine well.  Plan: Will continue current medications. Discharge scheduled for later this morning. 
Patient is a 62 y/o male domiciled with wife, employed as a  with [employer], 3 past inpatient hospitalizations, most recent on [facility] L4 (07/15/21-07/26/21), in outpatient treatment with Dr. Rivas at [facility] PACE program (started 08/30/21, completed [facility] PHP on 08/27/21), pt also in 12 step [facility] for sex addiction (watching pornography and masturbating), reports of 2 past aborted SA (contemplating shooting himself with a shot gun 04/21 and self-aborted carbon monoxide poisoning on 06/30/21), no reported hx of violence/aggression/legal issues, social Etoh use, denies other past and current substance use, no PMH, who was BIB son to [facility] ED for worsening depression and suicidal ideations.    On exam, patient reports improved mood and feels hopeful about the future, tolerating Clomipramine well.  Plan: Will increase Clomipramine to 100mg at bedtime and continue rest of the current medications. Discharge scheduled for tomorrow, 09/17. 
Patient is a 60 y/o male domiciled with wife, employed as a  with [employer], 3 past inpatient hospitalizations, most recent on [facility] L4 (07/15/21-07/26/21), in outpatient treatment with Dr. Rivas at [facility] PACE program (started 08/30/21, completed [facility] PHP on 08/27/21), pt also in 12 step [facility] for sex addiction (watching pornography and masturbating), reports of 2 past aborted SA (contemplating shooting himself with a shot gun 04/21 and self-aborted carbon monoxide poisoning on 06/30/21), no reported hx of violence/aggression/legal issues, social Etoh use, denies other past and current substance use, no PMH, who was BIB son to [facility] ED for worsening depression and suicidal ideations.    On exam, patient still reports low mood and guilt, remains discharge focused, still endorsing passive suicidal ideation, but denies current intent or plan.   Plan: Will continue to taper and d/c Luvox. Will initiate Clomipramine 25mg at bedtime. Will continue rest of the current medications. 
Patient is a 60 y/o male domiciled with wife, employed as a  with [employer], 3 past inpatient hospitalizations, most recent on [facility] L4 (07/15/21-07/26/21), in outpatient treatment with Dr. Rivas at [facility] PACE program (started 08/30/21, completed [facility] PHP on 08/27/21), pt also in 12 step [facility] for sex addiction (watching pornography and masturbating), reports of 2 past aborted SA (contemplating shooting himself with a shot gun 04/21 and self-aborted carbon monoxide poisoning on 06/30/21), no reported hx of violence/aggression/legal issues, social Etoh use, denies other past and current substance use, no PMH, who was BIB son to [facility] ED for worsening depression and suicidal ideations.    On exam, patient  reports depressed mood, guilt, and hopelessness. He also endorses passive suicidal ideation, but denies current intent or plan. appetite and sleep are adequate. Taking meds.   Plan: Will reduce Luvox to 150mg with a plan to taper and d/c. Will initiate Clomipramine 25mg at bedtime starting Monday 09/13. Will continue rest of the current medications. 
Patient is a 62 y/o male domiciled with wife, employed as a  with [employer], 3 past inpatient hospitalizations, most recent on [facility] L4 (07/15/21-07/26/21), in outpatient treatment with Dr. Rivas at [facility] PACE program (started 08/30/21, completed [facility] PHP on 08/27/21), pt also in 12 step [facility] for sex addiction (watching pornography and masturbating), reports of 2 past aborted SA (contemplating shooting himself with a shot gun 04/21 and self-aborted carbon monoxide poisoning on 06/30/21), no reported hx of violence/aggression/legal issues, social Etoh use, denies other past and current substance use, no PMH, who was BIB son to [facility] ED for worsening depression and suicidal ideations.    On exam, patient with improved mood, denying SI but still endorsing guilt and shame; taking meds.  Plan: Will d/c Luvox. Will increase Clomipramine to 75mg at bedtime and continue rest of the current medications. Discharge planned for Friday 09/17. 
Patient is a 62 y/o male domiciled with wife, employed as a  with [employer], 3 past inpatient hospitalizations, most recent on [facility] L4 (07/15/21-07/26/21), in outpatient treatment with Dr. Rivas at [facility] PACE program (started 08/30/21, completed [facility] PHP on 08/27/21), pt also in 12 step [facility] for sex addiction (watching pornography and masturbating), reports of 2 past aborted SA (contemplating shooting himself with a shot gun 04/21 and self-aborted carbon monoxide poisoning on 06/30/21), no reported hx of violence/aggression/legal issues, social Etoh use, denies other past and current substance use, no PMH, who was BIB son to [facility] ED for worsening depression and suicidal ideations.    On exam, patient with improved mood, still endorsing guilt and shame but denying SI, taking meds.  Plan: Will continue to taper and d/c Luvox. Will increase Clomipramine to 50mg at bedtime and continue rest of the current medications.

## 2021-09-17 NOTE — BH INPATIENT PSYCHIATRY PROGRESS NOTE - NSBHINPTBILLING_PSY_ALL_CORE
80880 - Inpatient Moderate Complexity
04880 - Inpatient Moderate Complexity
85512 - Inpatient Moderate Complexity
33623 - Hospital Discharge Day Management; 30 min or less
83526 - Inpatient Moderate Complexity
09309 - Inpatient Moderate Complexity
25268 - Inpatient Moderate Complexity
56147 - Inpatient Moderate Complexity

## 2021-09-17 NOTE — BH INPATIENT PSYCHIATRY PROGRESS NOTE - NSCGISEVERILLNESS_PSY_ALL_CORE
5 = Markedly ill - intrusive symptoms that distinctly impair social/occupational function or cause intrusive levels of distress

## 2021-09-17 NOTE — BH INPATIENT PSYCHIATRY PROGRESS NOTE - NSDCCRITERIA_PSY_ALL_CORE
50% reduction in mood symptoms, no SI, and CGI <3

## 2021-09-17 NOTE — BH INPATIENT PSYCHIATRY PROGRESS NOTE - NSBHFUPINTERVALHXFT_PSY_A_CORE
Patient seen for worsening depression and SI. Chart reviewed and case discussed with interdisciplinary staff. No significant overnight event reported. On encounter, patient reports mood as "much better", feels hopeful about the future, denies any SI/HI. His appetite and sleep remain fair. Denies hallucinations. Denies symptoms consistent with darwin. Patient remains med compliant. No side effects reported or in evidence. 
Patient seen for worsening depression and SI. Chart reviewed and case discussed with interdisciplinary staff. No significant overnight event reported. On encounter, patient reports improved mood, still endorsing hopelessness and feelings of guilt and shame, but denies any SI/HI. His appetite and sleep are adequate. Denies hallucinations. Denies symptoms consistent with darwin. Taking meds. No side effects reported or in evidence. 
Patient seen for worsening depression and SI. Chart reviewed and case discussed with interdisciplinary staff. On encounter, patient reports low mood and feelings of guilt and shame in context of his addiction to pornography. Patient is worried about his ongoing relationship troubles given his proclivities. He reports passive SI but denies any active intent or plan. His appetite and sleep are adequate. He denies any headache or dizziness. Denies hallucinations. Denies symptoms consistent with darwin. 
Patient seen for worsening depression and SI. Chart reviewed and case discussed with interdisciplinary staff. Per staff, patient remains discharge focused. On encounter, patient continues to report low mood and inquires how long he'll have to stay here, still reports passive SI but denies any active intent or plan. His appetite and sleep are adequate. He denies any headache or dizziness. Denies hallucinations. Denies symptoms consistent with darwin. Taking meds. 
Patient seen for worsening depression and SI. Chart reviewed and case discussed with interdisciplinary staff. No significant overnight event reported. On encounter, patient reports some improvement in mood, still endorsing hopelessness but is no longer suicidal. His appetite and sleep are adequate. Denies hallucinations. Denies symptoms consistent with darwin. Taking meds. No side effects reported or in evidence. 
Patient seen for worsening depression and SI. Chart reviewed and case discussed with interdisciplinary staff. Per staff, patient remains discharge focused. On encounter, patient reports low mood and inquires how he can get out of here, states he made a mistake and should not be here, minimizes circumstances lading to hospitalization, still reports passive SI but denies any active intent or plan. His appetite and sleep are adequate. He denies any headache or dizziness. Denies hallucinations. Denies symptoms consistent with darwin. Taking meds. 
Patient seen for worsening depression and SI. Chart reviewed and case discussed with interdisciplinary staff. No significant overnight event reported. On encounter, patient reports mood as "good", is looking forward to going home today, feels hopeful about the future, denies any SI/HI. His appetite and sleep remain fair. Patient denies any hallucinations. Denies symptoms consistent with darwin. Patient remains med compliant. No side effects reported or in evidence. 
Patient seen for worsening depression and SI. Chart reviewed and case discussed with interdisciplinary staff. No significant overnight event reported. On encounter, patient continues to report improved mood, is no longer endorsing hopelessness but still reports feelings of guilt and shame; denies any SI/HI. His appetite and sleep remain fair. Denies hallucinations. Denies symptoms consistent with darwin. Consistently taking meds. No side effects reported or in evidence.

## 2021-09-17 NOTE — BH INPATIENT PSYCHIATRY PROGRESS NOTE - NSCGIIMPROVESX_PSY_ALL_CORE
2 = Much improved - notably better with signficant reduction of symptoms; increase in the level of functioning but some symptoms remain
4 = No change - symptoms remain essentially unchanged
2 = Much improved - notably better with signficant reduction of symptoms; increase in the level of functioning but some symptoms remain
4 = No change - symptoms remain essentially unchanged
3 = Minimally improved - slightly better with little or no clinically meaningful reduction of symptoms.  Represents very little change in basic clinical status, level of care, or functional capacity.
4 = No change - symptoms remain essentially unchanged
3 = Minimally improved - slightly better with little or no clinically meaningful reduction of symptoms.  Represents very little change in basic clinical status, level of care, or functional capacity.
4 = No change - symptoms remain essentially unchanged

## 2021-09-24 NOTE — SOCIAL WORK POST DISCHARGE FOLLOW UP NOTE - NSBHSWFOLLOWUP_PSY_ALL_CORE_FT
Writer is LENI iglesias.  Per message received, the patient  tried to reschedule his appointment with the Crisis center and was advised to have inpatient social work get him the appointment.   tried with the patient on the phone to reschedule his bridge appointment on the website.  The patient indicated that he can make any time after October 4 th but the website only allowed booking till October 1.  Above communicated to the Crisis Center who stated that they would contact the patient directly and provide him with an appointment.

## 2021-09-24 NOTE — SOCIAL WORK POST DISCHARGE FOLLOW UP NOTE - NSBHSWFOLLOWUP_PSY_ALL_CORE_FT
Writer is LENI iglesias.  Per 's communication with [facility], patient's appointment was rescheduled for October 5.

## 2021-09-30 DIAGNOSIS — F32.3 MAJOR DEPRESSIVE DISORDER, SINGLE EPISODE, SEVERE WITH PSYCHOTIC FEATURES: ICD-10-CM

## 2021-10-04 ENCOUNTER — TRANSCRIPTION ENCOUNTER (OUTPATIENT)
Age: 61
End: 2021-10-04

## 2021-10-06 NOTE — SOCIAL WORK POST DISCHARGE FOLLOW UP NOTE - NSBHSWFOLLOWUP_PSY_ALL_CORE_FT
Writer is LENI iglesias.  Per discharge planning, the patient did not show for his 10/05/2021 appointment at the Crisis Center.  [facility] contacted him [phone] - left message and requested discharge planning to send him a 10 day letter.

## 2021-10-13 NOTE — SOCIAL WORK POST DISCHARGE FOLLOW UP NOTE - NSBHSWFOLLOWUP_PSY_ALL_CORE_FT
Writer is LENI iglesias.  Patient was sent a 10 day letter and did not respond.  Treatment team determined that patient was not at risk at time of discharge.

## 2021-11-01 ENCOUNTER — NON-APPOINTMENT (OUTPATIENT)
Age: 61
End: 2021-11-01

## 2021-11-01 ENCOUNTER — APPOINTMENT (OUTPATIENT)
Dept: SURGERY | Facility: CLINIC | Age: 61
End: 2021-11-01
Payer: COMMERCIAL

## 2021-11-01 ENCOUNTER — TRANSCRIPTION ENCOUNTER (OUTPATIENT)
Age: 61
End: 2021-11-01

## 2021-11-01 VITALS
OXYGEN SATURATION: 100 % | WEIGHT: 205 LBS | DIASTOLIC BLOOD PRESSURE: 94 MMHG | HEIGHT: 74 IN | HEART RATE: 84 BPM | SYSTOLIC BLOOD PRESSURE: 145 MMHG | TEMPERATURE: 96.6 F | BODY MASS INDEX: 26.31 KG/M2

## 2021-11-01 DIAGNOSIS — Z78.9 OTHER SPECIFIED HEALTH STATUS: ICD-10-CM

## 2021-11-01 DIAGNOSIS — K40.20 BILATERAL INGUINAL HERNIA, W/OUT OBSTRUCTION OR GANGRENE, NOT SPECIFIED AS RECURRENT: ICD-10-CM

## 2021-11-01 DIAGNOSIS — Z82.49 FAMILY HISTORY OF ISCHEMIC HEART DISEASE AND OTHER DISEASES OF THE CIRCULATORY SYSTEM: ICD-10-CM

## 2021-11-01 DIAGNOSIS — Z80.0 FAMILY HISTORY OF MALIGNANT NEOPLASM OF DIGESTIVE ORGANS: ICD-10-CM

## 2021-11-01 DIAGNOSIS — Z86.59 PERSONAL HISTORY OF OTHER MENTAL AND BEHAVIORAL DISORDERS: ICD-10-CM

## 2021-11-01 PROBLEM — Z00.00 ENCOUNTER FOR PREVENTIVE HEALTH EXAMINATION: Status: ACTIVE | Noted: 2021-11-01

## 2021-11-01 PROCEDURE — 99204 OFFICE O/P NEW MOD 45 MIN: CPT

## 2021-11-01 NOTE — PHYSICAL EXAM
[JVD] : no jugular venous distention  [Carotid Bruits] : no carotid bruits [Normal Breath Sounds] : Normal breath sounds [Normal Heart Sounds] : normal heart sounds [Normal Rate and Rhythm] : normal rate and rhythm [Stool Sample Taken] : A stool sample was obtained  [Occult Blood Positive] : was negative for occult blood [Gross Blood] : no gross blood [Fecal Impaction] : a fecal impaction was present [No Rash or Lesion] : No rash or lesion [Alert] : alert [Oriented to Person] : disoriented to person [Oriented to Place] : oriented to place [Oriented to Time] : oriented to time [Anxious] : anxious [de-identified] : well developed white male in no distress [de-identified] : noninjected and nonicteric [de-identified] : without adenopathy [de-identified] : normal bowel sounds, without distension or tenderness. [de-identified] : bilateral orchietomies with scrotal scars. Bilateral inguinal hernias. [de-identified] : without masses or tenderness [de-identified] : without calf pain or swelling

## 2021-11-01 NOTE — REVIEW OF SYSTEMS
[Fever] : no fever [Chills] : chills [Feeling Poorly] : feeling poorly [Feeling Tired] : not feeling tired [Recent Weight Gain (___ Lbs)] : no recent weight gain [Recent Weight Loss (___ Lbs)] : no recent weight loss [Suicidal] : suicidal [Sleep Disturbances] : no sleep disturbances [Anxiety] : anxiety [Depression] : depression [Change In Personality] : no personality change [Emotional Problems] : no emotional problems [Proptosis] : no proptosis [Hot Flashes] : hot flashes [Muscle Weakness] : no muscle weakness [Negative] : Heme/Lymph [FreeTextEntry8] : hx of renal colic, had bilateral orchiectomy one month ago [de-identified] : presently under the care of a psyciatrist

## 2021-11-01 NOTE — HISTORY OF PRESENT ILLNESS
[de-identified] : bilateral inguinal hernias [de-identified] : 61 year old white male who presents c/o bilateral inguinal hernias he has had for the past few years that have increased in size and are causing his soreness. He denies any nausea or vomiting or any changes in his bowel habits. He denies urinary symptoms.\par Pt reports that "i am a sex addict' and that he just had a bilateral Orchiectomy one month ago by a plastic surgeon in [place]. He is having night sweats and is on no hormone replacement. Pt also states he was recently "Suicidal', and is now under the care of a Psychiatrist.

## 2021-11-01 NOTE — ASSESSMENT
[FreeTextEntry1] : Pt is very anxious and has 'major things going on'. I have discussed with his all of the signs and symptoms of incarceration and strangulation and the importance of calling immediately should they develop.\par I have also discussed with him that since he has minimal symptoms, he does not need to have surgery immediately. I have advised him to discuss it with his psychiatrist and to return when he advises it.

## 2021-11-13 ENCOUNTER — EMERGENCY (EMERGENCY)
Facility: HOSPITAL | Age: 61
LOS: 1 days | Discharge: OTHER INSTITUTION | End: 2021-11-13
Attending: EMERGENCY MEDICINE
Payer: COMMERCIAL

## 2021-11-13 VITALS
OXYGEN SATURATION: 100 % | DIASTOLIC BLOOD PRESSURE: 80 MMHG | HEART RATE: 100 BPM | TEMPERATURE: 98 F | WEIGHT: 199.96 LBS | SYSTOLIC BLOOD PRESSURE: 113 MMHG | RESPIRATION RATE: 20 BRPM | HEIGHT: 74 IN

## 2021-11-13 DIAGNOSIS — F43.0 ACUTE STRESS REACTION: ICD-10-CM

## 2021-11-13 LAB
ALBUMIN SERPL ELPH-MCNC: 4 G/DL — SIGNIFICANT CHANGE UP (ref 3.3–5.2)
ALP SERPL-CCNC: 48 U/L — SIGNIFICANT CHANGE UP (ref 40–120)
ALT FLD-CCNC: 32 U/L — SIGNIFICANT CHANGE UP
AMPHET UR-MCNC: NEGATIVE — SIGNIFICANT CHANGE UP
ANION GAP SERPL CALC-SCNC: 12 MMOL/L — SIGNIFICANT CHANGE UP (ref 5–17)
APAP SERPL-MCNC: <3 UG/ML — LOW (ref 10–26)
APPEARANCE UR: CLEAR — SIGNIFICANT CHANGE UP
AST SERPL-CCNC: 34 U/L — SIGNIFICANT CHANGE UP
BACTERIA # UR AUTO: ABNORMAL
BARBITURATES UR SCN-MCNC: NEGATIVE — SIGNIFICANT CHANGE UP
BASOPHILS # BLD AUTO: 0.05 K/UL — SIGNIFICANT CHANGE UP (ref 0–0.2)
BASOPHILS NFR BLD AUTO: 0.7 % — SIGNIFICANT CHANGE UP (ref 0–2)
BENZODIAZ UR-MCNC: NEGATIVE — SIGNIFICANT CHANGE UP
BILIRUB SERPL-MCNC: 0.7 MG/DL — SIGNIFICANT CHANGE UP (ref 0.4–2)
BILIRUB UR-MCNC: NEGATIVE — SIGNIFICANT CHANGE UP
BUN SERPL-MCNC: 16.1 MG/DL — SIGNIFICANT CHANGE UP (ref 8–20)
CALCIUM SERPL-MCNC: 8.7 MG/DL — SIGNIFICANT CHANGE UP (ref 8.6–10.2)
CHLORIDE SERPL-SCNC: 103 MMOL/L — SIGNIFICANT CHANGE UP (ref 98–107)
CO2 SERPL-SCNC: 25 MMOL/L — SIGNIFICANT CHANGE UP (ref 22–29)
COCAINE METAB.OTHER UR-MCNC: NEGATIVE — SIGNIFICANT CHANGE UP
COLOR SPEC: YELLOW — SIGNIFICANT CHANGE UP
CREAT SERPL-MCNC: 1.01 MG/DL — SIGNIFICANT CHANGE UP (ref 0.5–1.3)
DIFF PNL FLD: ABNORMAL
EOSINOPHIL # BLD AUTO: 0.11 K/UL — SIGNIFICANT CHANGE UP (ref 0–0.5)
EOSINOPHIL NFR BLD AUTO: 1.6 % — SIGNIFICANT CHANGE UP (ref 0–6)
EPI CELLS # UR: SIGNIFICANT CHANGE UP
ETHANOL SERPL-MCNC: <10 MG/DL — SIGNIFICANT CHANGE UP (ref 0–9)
GLUCOSE SERPL-MCNC: 96 MG/DL — SIGNIFICANT CHANGE UP (ref 70–99)
GLUCOSE UR QL: NEGATIVE MG/DL — SIGNIFICANT CHANGE UP
HCT VFR BLD CALC: 45 % — SIGNIFICANT CHANGE UP (ref 39–50)
HGB BLD-MCNC: 15.3 G/DL — SIGNIFICANT CHANGE UP (ref 13–17)
IMM GRANULOCYTES NFR BLD AUTO: 0.3 % — SIGNIFICANT CHANGE UP (ref 0–1.5)
KETONES UR-MCNC: ABNORMAL
LEUKOCYTE ESTERASE UR-ACNC: ABNORMAL
LYMPHOCYTES # BLD AUTO: 2.22 K/UL — SIGNIFICANT CHANGE UP (ref 1–3.3)
LYMPHOCYTES # BLD AUTO: 31.5 % — SIGNIFICANT CHANGE UP (ref 13–44)
MCHC RBC-ENTMCNC: 29.8 PG — SIGNIFICANT CHANGE UP (ref 27–34)
MCHC RBC-ENTMCNC: 34 GM/DL — SIGNIFICANT CHANGE UP (ref 32–36)
MCV RBC AUTO: 87.5 FL — SIGNIFICANT CHANGE UP (ref 80–100)
METHADONE UR-MCNC: NEGATIVE — SIGNIFICANT CHANGE UP
MONOCYTES # BLD AUTO: 0.5 K/UL — SIGNIFICANT CHANGE UP (ref 0–0.9)
MONOCYTES NFR BLD AUTO: 7.1 % — SIGNIFICANT CHANGE UP (ref 2–14)
NEUTROPHILS # BLD AUTO: 4.14 K/UL — SIGNIFICANT CHANGE UP (ref 1.8–7.4)
NEUTROPHILS NFR BLD AUTO: 58.8 % — SIGNIFICANT CHANGE UP (ref 43–77)
NITRITE UR-MCNC: NEGATIVE — SIGNIFICANT CHANGE UP
OPIATES UR-MCNC: NEGATIVE — SIGNIFICANT CHANGE UP
PCP SPEC-MCNC: SIGNIFICANT CHANGE UP
PCP UR-MCNC: NEGATIVE — SIGNIFICANT CHANGE UP
PH UR: 6.5 — SIGNIFICANT CHANGE UP (ref 5–8)
PLATELET # BLD AUTO: 290 K/UL — SIGNIFICANT CHANGE UP (ref 150–400)
POTASSIUM SERPL-MCNC: 4.9 MMOL/L — SIGNIFICANT CHANGE UP (ref 3.5–5.3)
POTASSIUM SERPL-SCNC: 4.9 MMOL/L — SIGNIFICANT CHANGE UP (ref 3.5–5.3)
PROT SERPL-MCNC: 6.6 G/DL — SIGNIFICANT CHANGE UP (ref 6.6–8.7)
PROT UR-MCNC: 30 MG/DL
RBC # BLD: 5.14 M/UL — SIGNIFICANT CHANGE UP (ref 4.2–5.8)
RBC # FLD: 12.6 % — SIGNIFICANT CHANGE UP (ref 10.3–14.5)
RBC CASTS # UR COMP ASSIST: ABNORMAL /HPF (ref 0–4)
SALICYLATES SERPL-MCNC: <0.6 MG/DL — LOW (ref 10–20)
SARS-COV-2 RNA SPEC QL NAA+PROBE: SIGNIFICANT CHANGE UP
SODIUM SERPL-SCNC: 140 MMOL/L — SIGNIFICANT CHANGE UP (ref 135–145)
SP GR SPEC: 1.01 — SIGNIFICANT CHANGE UP (ref 1.01–1.02)
THC UR QL: NEGATIVE — SIGNIFICANT CHANGE UP
TSH SERPL-MCNC: 2.35 UIU/ML — SIGNIFICANT CHANGE UP (ref 0.27–4.2)
UROBILINOGEN FLD QL: NEGATIVE MG/DL — SIGNIFICANT CHANGE UP
WBC # BLD: 7.04 K/UL — SIGNIFICANT CHANGE UP (ref 3.8–10.5)
WBC # FLD AUTO: 7.04 K/UL — SIGNIFICANT CHANGE UP (ref 3.8–10.5)
WBC UR QL: SIGNIFICANT CHANGE UP

## 2021-11-13 PROCEDURE — 93010 ELECTROCARDIOGRAM REPORT: CPT

## 2021-11-13 PROCEDURE — 90792 PSYCH DIAG EVAL W/MED SRVCS: CPT

## 2021-11-13 PROCEDURE — 99220: CPT

## 2021-11-13 NOTE — ED BEHAVIORAL HEALTH ASSESSMENT NOTE - SUMMARY
Patient is a 61 year old male,  for [employer], domiciled with wife. PPHX: MDD and Suicidal Ideation. Two past aborted SA (contemplating shooting himself with a shot gun 04/21 and self-aborted carbon monoxide poisoning on 06/30/21), no reported hx of violence/aggression/legal issues, social ETOH use, denies other past and current substance use, no PMH.  Patient reportedly went to [facility] ED last night with SI and was discharged home.  Upon returning home by Uber, he was unable to get in the house because his wife took all his belongings home and thought he was getting admitted. He ended up sleeping in the garage for a while and then decided to walk to his Son's house in [place]. He was then picked up by [employer] and brought to [facility].  Patient reports passive suicidal ideation and reports that he sometimes wishes he would get hit but a bus, but that he would never do anything to hurt himself.  Patient reports multiple stressors since April 2021 when he was diagnosed with sex addiction after his wife confronted him about watching pornography and masterbating. Patient reports extreme guilt and shame associated with watching pornography and the stress on his marriage. He has had several inpatient hospitalizations since April and has been non-compliant with follow up treatment and medications. He reportedly decided to see his own therapist Lori Cool, but states that he doesn't find her helpful. Patient states that nothing works and he doesn't really like any therapist he has ever spoken to.  Patient denies symptoms of darwin, denies promiscuous behavior, denies excessive spending or gambling.  He reports that he has financial stress and recently had to take out a loan because he spent $10,000 on a 3 week intensive program for sex addiction in [place] and that he was staying in hotels for a while because of marital problems. Endorses depressed mood and shame associated with his " wandering eye and looking at his phone."   Patient had his testicles removed on October 1, 2021 by a plastic surgeon in [place] in an attempt to cure his urges.  Patient reports night sweats since removal and states that he still has sexual desire but can no longer become aroused.  He identifies reasons for living, such as children and desire to fix his marriage.  Reports that he feels helpless about his sexual urges which trigger his suicidal thoughts and statements.   Collateral obtained from wife Areli Vallejo who states that the patient had no previous psychiatric history prior to April when he was diagnosed with a sex addiction. She believes that he is not handling the diagnosis well and it has made him depressed and illogical. He has left the house and gone missing several times since then, and she confirms his impulsive decision to remove his testicles. Wife states that the patient often expresses guilt and shame but then will become angry and gaslight her.  She denies any physical violence or concern for safety. Last night the patient reportedly was crying and was expressing SI. He told his wife to say goodbye to their children and she said this was the worst he has ever been.  Wife states that the patient can come home but  reports concerns for his safety if he is feeling like he did last night.  Patient initially stated that he wanted to go home and that he doesn't think inpatient admission would be helpful.  He contracted for safety and stated that when he said he was suicidal last night, he was feeling hopeless and desperate.  After speaking with his wife, patient reported that he changed his mind and  thinks he needs inpatient admission. Stated that his wife and children were worried about him and he's worried about himself.  Patient reports " I don't know, I might do something to hurt myself."   Patient is unable to contract for safety, experiencing multiple stressors and exhibits impulsive behavior.    Admit to inpatient unit pending bed availability.

## 2021-11-13 NOTE — ED BEHAVIORAL HEALTH ASSESSMENT NOTE - PRIMARY DX
Acute stress reaction causing mixed disturbance of emotion and conduct Adjustment disorder with mixed disturbance of emotions and conduct

## 2021-11-13 NOTE — ED CDU PROVIDER INITIAL DAY NOTE - MEDICAL DECISION MAKING DETAILS
recent separation for significant other. here with SI. plan for overnight observation and re-eval by psych in AM
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
## 2021-11-13 NOTE — ED ADULT NURSE NOTE - CHIEF COMPLAINT
The patient is a 61y Male complaining of psychiatric evaluation. The patient is a 57y Male complaining of psychiatric evaluation.

## 2021-11-13 NOTE — ED PROVIDER NOTE - PROGRESS NOTE DETAILS
pt seen by psych and recommend discharge home with outpatient f/u pt reassessed by psych and got SI again, will be transferred for psych inaptient d/w with psych. will not be transferred tonight. hold overnight for re-eval by psych in morning

## 2021-11-13 NOTE — ED BEHAVIORAL HEALTH ASSESSMENT NOTE - RISK ASSESSMENT
RF: Impulsivity, Recent admissions, non-compliance with treatment  PF: Supportive family, stable residence, stable career Moderate Acute Suicide Risk

## 2021-11-13 NOTE — ED ADULT NURSE REASSESSMENT NOTE - NS ED NURSE REASSESS COMMENT FT1
Assumed care of patient. no c/o patient alert and cooperative resting nad noted.  Pt offer poi fluids and tolerated well.  Will monitor and chart changes and maintain safe environment

## 2021-11-13 NOTE — ED PROVIDER NOTE - NSFOLLOWUPINSTRUCTIONS_ED_ALL_ED_FT
1. follow up with your psychiatrist  2. return promptly in c/o worsening symptoms  3. practice meditation or some hobby
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
## 2021-11-13 NOTE — ED ADULT TRIAGE NOTE - CHIEF COMPLAINT QUOTE
pt a+ox3, tearful in triage, BIBA requesting psych eval. pt reports SI, states his wife is "leaving him", with previous attempt in June. pt denies HI.

## 2021-11-13 NOTE — ED BEHAVIORAL HEALTH ASSESSMENT NOTE - DESCRIPTION
T(C): 36.9 (13 Nov 2021 16:00), Max: 36.9 (13 Nov 2021 16:00)  T(F): 98.5 (13 Nov 2021 16:00), Max: 98.5 (13 Nov 2021 16:00)  HR: 86 (13 Nov 2021 16:00) (86 - 100)  BP: 114/74 (13 Nov 2021 16:00) (113/80 - 114/74)  BP(mean): --  ABP: --  ABP(mean): --  RR: 20 (13 Nov 2021 16:00) (20 - 20)  SpO2: 99% (13 Nov 2021 16:00) (99% - 100%) Lives with wife, 5 adult Children, works as special  N/A

## 2021-11-13 NOTE — CHART NOTE - NSCHARTNOTEFT_GEN_A_CORE
SW Note: SW made aware pt is T&R, would benefit from outpt f/u. Pt reports he is currently in treatment, however wants to switch providers, options discussed, pt is interested in [facility] intake, Pt made aware insurance will not cover pt seeing 2 providers at once for outpt mental health, pt expressed understanding. HIPAA release signed, pt aware intake is via phone. Appt made for next available, 11/17 at 1pm. Phone# provided for intake is [phone]. Pt provided with appt card and brochure, no further SW services at this time

## 2021-11-13 NOTE — ED CDU PROVIDER INITIAL DAY NOTE - OBJECTIVE STATEMENT
60 y/o M with h/o depression, prior failed suicide attempt p/w feeling very depressed and sad and has suicidal thoughts off& on, no plan. Pt was seen at [facility] last night and was discharged and he feels worse. Pt is going through separation with wife, no alcohol or drug use or smoking

## 2021-11-13 NOTE — ED PROVIDER NOTE - OBJECTIVE STATEMENT
62 y/o M with h/o depression, prior failed suicide attempt p/w feeling very depressed and sad and has suicidal thoughts off& on, no plan. Pt was seen at [facility] last night and was discharged and he feels worse. Pt is going through separation with wife, no alcohol or drug use or smoking

## 2021-11-14 VITALS
DIASTOLIC BLOOD PRESSURE: 80 MMHG | TEMPERATURE: 98 F | OXYGEN SATURATION: 99 % | RESPIRATION RATE: 18 BRPM | HEART RATE: 84 BPM | SYSTOLIC BLOOD PRESSURE: 125 MMHG

## 2021-11-14 DIAGNOSIS — F43.25 ADJUSTMENT DISORDER WITH MIXED DISTURBANCE OF EMOTIONS AND CONDUCT: ICD-10-CM

## 2021-11-14 PROCEDURE — 99285 EMERGENCY DEPT VISIT HI MDM: CPT

## 2021-11-14 PROCEDURE — 84443 ASSAY THYROID STIM HORMONE: CPT

## 2021-11-14 PROCEDURE — 93005 ELECTROCARDIOGRAM TRACING: CPT

## 2021-11-14 PROCEDURE — U0003: CPT

## 2021-11-14 PROCEDURE — 80053 COMPREHEN METABOLIC PANEL: CPT

## 2021-11-14 PROCEDURE — 81001 URINALYSIS AUTO W/SCOPE: CPT

## 2021-11-14 PROCEDURE — 36415 COLL VENOUS BLD VENIPUNCTURE: CPT

## 2021-11-14 PROCEDURE — U0005: CPT

## 2021-11-14 PROCEDURE — 85025 COMPLETE CBC W/AUTO DIFF WBC: CPT

## 2021-11-14 PROCEDURE — 80307 DRUG TEST PRSMV CHEM ANLYZR: CPT

## 2021-11-14 PROCEDURE — G0378: CPT

## 2021-11-14 PROCEDURE — 99217: CPT

## 2021-11-14 PROCEDURE — 99211 OFF/OP EST MAY X REQ PHY/QHP: CPT

## 2021-11-14 NOTE — ED CDU PROVIDER SUBSEQUENT DAY NOTE - PSYCHIATRIC [-], MLM
[Well Developed] : well developed [Well Nourished] : well nourished [NAD] : in no acute distress [Alert and Active] : alert and active [PERRL] : pupils were equal, round, reactive to light  [EOMI] : ~T the extraocular movements were normal and intact [icteric] : anicteric [No Palpable Thyroid] : no palpable thyroid [Moist & Pink Mucous Membranes] : moist and pink mucous membranes [Normal Oropharynx] : the oropharynx was normal [Oral Ulcers] : no oral ulcers [CTAB] : lungs clear to auscultation bilaterally [Respiratory Distress] : no respiratory distress  [Wheeze] : no wheezing  [Regular Rate and Rhythm] : regular rate and rhythm [Soft] : soft  [Distended] : non distended [Tender] : non tender [Normal Bowel Sounds] : normal bowel sounds [Rebound] : no rebound tenderness [Guarding] : no guarding [Stool Palpable] : no stool palpable [No HSM] : no hepatosplenomegaly appreciated [Lymphadenopathy] : no lymphadenopathy  no anxiety/no insomnia/not exhibiting opposition/not suicidal [Joint Swelling] : no joint swelling [Joint Tenderness] : no joint tenderness [Back Tenderness] : no back tenderness [Chest Wall Tenderness] : no chest wall tenderness [Normal Tone] : normal tone [Focal Deficits] : no focal deficits [Verbal] : verbal [Wheelchair Bound] : not wheelchair bound [Well-Perfused] : well-perfused [Normal Capillary Refill] : normal capillary refill  [Cyanosis] : no cyanosis [Clubbing] : no clubbing [Rash] : no rash [Eczema] : no eczema [Dry Skin] : no dry skin [Pallor] : no pallor [Jaundice] : no jaundice [Scars] : no scars [Acne] : no acne [Acanthosis Nigricans] : no acanthosis nigricans [Interactive] : interactive [Appropriate Behavior] : appropriate behavior [Anxious] : was not anxious

## 2021-11-14 NOTE — CHART NOTE - NSCHARTNOTEFT_GEN_A_CORE
Patient to be reassessed this morning by provider prior to discharge. Patient's current plan is to be transferred on 9.13 status. HALIMA CALVERT has reviewed the case and is requesting updated note this morning to indicated if patient is still expressing suicidal ideation. LENI continues to follow for safe and appropriate discharge planning.

## 2021-11-14 NOTE — CHART NOTE - NSCHARTNOTEFT_GEN_A_CORE
SW faxed patient's 9.13 legals to [facility] for review (Fax: [phone]). SW awaiting confirmation from [facility] the patient has been accepted. [facility] employee, fide Brower MD is reviewing final completed note from [facility] NP. LENI continues to follow.

## 2021-11-14 NOTE — ED BEHAVIORAL HEALTH NOTE - BEHAVIORAL HEALTH NOTE
PROGRESS NOTE: 21 @ 10:46  	  • Reason for Ongoing Consultation: 	    ID: 61yyo Male with HEALTH ISSUES - PROBLEM Dx:  Acute stress reaction causing mixed disturbance of emotion and conduct            INTERVAL DATA:   • Interval Chief Complaint: "I thought about jumping off of a mountain, or a height".  • Interval History: Seen on follow up.  Pt endorsing passive and active SI with plan to jump off a mountain or height, building".  He claims 20 % intention of acting on then and basing on outcome of his marriage.  He also feels he needs to get on meds for his mood and invest in therapy also as an attempt to reconcile with wife.  Pt does admit to a "plan B", to cash out 401k, pay off home for wife and use rest for he and 26 yo emotionally disturbed son.  Pt denies HI and no evidence agitation or aggression.  Pt refers to children as protective factors and became tearful.  Pt requesting inpt psych admission and will sign voluntary.  SOH reviewing.    REVIEW OF SYSTEMS:   • Constitutional Symptoms	No complaints  • Eyes	No complaints  • Ears / Nose / Throat / Mouth	No complaints  • Cardiovascular	No complaints  • Respiratory	No complaints  • Gastrointestinal	No complaints  • Genitourinary	No complaints  • Musculoskeletal	No complaints  • Skin	No complaints  • Neurological	No complaints  • Psychiatric (see HPI)	See HPI  • Endocrine	No complaints  • Hematologic / Lymphatic	No complaints  • Allergic / Immunologic	No complaints    REVIEW OF VITALS/LABS/IMAGING/INVESTIGATIONS:   • Vital signs reviewed: Yes  • Vital Signs:	    T(C): 36.5 (21 @ 07:23), Max: 36.9 (21 @ 16:00)  HR: 83 (21 @ 07:23) (77 - 100)  BP: 125/92 (21 @ 07:23) (113/80 - 136/77)  RR: 18 (21 @ 07:23) (18 - 20)  SpO2: 99% (21 @ 07:23) (98% - 100%)    • Available labs reviewed: Yes  • Available Lab Results:                           15.3   7.04  )-----------( 290      ( 2021 11:52 )             45.0     -    140  |  103  |  16.1  ----------------------------<  96  4.9   |  25.0  |  1.01    Ca    8.7      2021 11:52    TPro  6.6  /  Alb  4.0  /  TBili  0.7  /  DBili  x   /  AST  34  /  ALT  32  /  AlkPhos  48  11-13    LIVER FUNCTIONS - ( 2021 11:52 )  Alb: 4.0 g/dL / Pro: 6.6 g/dL / ALK PHOS: 48 U/L / ALT: 32 U/L / AST: 34 U/L / GGT: x             Urinalysis Basic - ( 2021 12:11 )    Color: Yellow / Appearance: Clear / S.015 / pH: x  Gluc: x / Ketone: Moderate  / Bili: Negative / Urobili: Negative mg/dL   Blood: x / Protein: 30 mg/dL / Nitrite: Negative   Leuk Esterase: Trace / RBC: 11-25 /HPF / WBC 3-5   Sq Epi: x / Non Sq Epi: Occasional / Bacteria: Occasional          MEDICATIONS:      PRN Medications:  • PRN Medications since last evaluation	  • PRN Details	    Current Medications:      Medication Side Effects:  • Medication Side Effects or Adverse Reactions (new or ongoing)	None known    MENTAL STATUS EXAM:   • Level of Consciousness	Alert  • General Appearance	Well developed  • Body Habitus	Well nourished  • Hygiene	Good  • Grooming	fair  • Behavior	Cooperative  • Eye Contact	Good  • Relatedness	fair  • Impulse Control	Normal  • Muscle Tone / Strength	Normal muscle tone/strength  • Abnormal Movements	No abnormal movements  • Gait / Station	Normal gait / station  • Speech Volume	soft  • Speech Rate	Normal  • Speech Spontaneity	delayed  • Speech Articulation	Normal  • Mood	Normal  • Affect Quality	depressed sad  • Affect Range	constricted  • Affect Congruence	Congruent  • Thought Process	Linear  • Thought Associations	Normal  • Thought Content	preoccupied  • Perceptions	No abnormalities  • Oriented to Time	Yes  • Oriented to Place	Yes  • Oriented to Situation	Yes  • Oriented to Person	Yes  • Attention / Concentration	Normal  • Estimated Intelligence	Average  • Recent Memory	Normal  • Remote Memory	Normal  • Fund of Knowledge	Normal  • Language	No abnormalities noted  • Judgment (regarding everyday events)	impaired  • Insight (regarding psychiatric illness)	Fair    SUICIDALITY:   • Suicidality (Interval)	passive active SI with plan to jump from height    HOMICIDALITY/AGGRESSION:   • Homicidality/Aggression	none known    DIAGNOSIS DSM-V:    Psychiatric Diagnosis (Corresponds to DSM-IV Axis I, II):   HEALTH ISSUES - PROBLEM Dx:  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           Medical Diagnosis (Corresponds to DSM-IV Axis III):  • Axis III	      ASSESSMENT OF CURRENT CONDITION:   Summary (include case differential, formulation and patient response to therapy):     Risk Assessment (consider static vs modifiable risk factors and protective factors; comment on level of risk for dangerous behavior):     PLAN    COVID Exposure Screen- Patient    1.         *Have you had a COVID-19 test in the last 90 days?  (  ) Yes  ( x ) No   (  ) Unknown- Reason: _____    2.         *Have you tested positive for COVID-19 antibodies? (  ) Yes   (x  ) No   (  ) Unknown- Reason:    3.         *Have you received 2 doses of the COVID-19 vaccine? (  ) Yes   (x  ) No   (  ) Unknown- Reason: _____    4.         *In the past 10 days, have you been around anyone with a positive COVID-19 test?* (  ) Yes   (  ) No   ( x ) Unknown- Reason: ____    5.         *Have you been out of [place] within the past 10 days?* (  ) Yes   (x  ) No   (  ) Unknown- Reason: _____ PROGRESS NOTE: 21 @ 10:46  	  • Reason for Ongoing Consultation: 	    ID: 61yyo Male with HEALTH ISSUES - PROBLEM Dx:  Acute stress reaction causing mixed disturbance of emotion and conduct            INTERVAL DATA:   • Interval Chief Complaint: "I thought about jumping off of a mountain, or a height".  • Interval History: Seen on follow up.  Pt endorsing passive and active SI with plan to jump off a mountain or height, building".  He claims 20 % intention of acting on then and basing on outcome of his marriage.  He also feels he needs to get on meds for his mood and invest in therapy also as an attempt to reconcile with wife.  Pt does admit to a "plan B", to cash out 401k, pay off home for wife and use rest for he and 26 yo emotionally disturbed son.  Pt denies HI and no evidence agitation or aggression.  Pt refers to children as protective factors and became tearful.  Pt requesting inpt psych admission and will sign voluntary.  SOH reviewing.    REVIEW OF SYSTEMS:   • Constitutional Symptoms	No complaints  • Eyes	No complaints  • Ears / Nose / Throat / Mouth	No complaints  • Cardiovascular	No complaints  • Respiratory	No complaints  • Gastrointestinal	No complaints  • Genitourinary	No complaints  • Musculoskeletal	No complaints  • Skin	No complaints  • Neurological	No complaints  • Psychiatric (see HPI)	See HPI  • Endocrine	No complaints  • Hematologic / Lymphatic	No complaints  • Allergic / Immunologic	No complaints    REVIEW OF VITALS/LABS/IMAGING/INVESTIGATIONS:   • Vital signs reviewed: Yes  • Vital Signs:	    T(C): 36.5 (21 @ 07:23), Max: 36.9 (21 @ 16:00)  HR: 83 (21 @ 07:23) (77 - 100)  BP: 125/92 (21 @ 07:23) (113/80 - 136/77)  RR: 18 (21 @ 07:23) (18 - 20)  SpO2: 99% (21 @ 07:23) (98% - 100%)    • Available labs reviewed: Yes  • Available Lab Results:                           15.3   7.04  )-----------( 290      ( 2021 11:52 )             45.0     -    140  |  103  |  16.1  ----------------------------<  96  4.9   |  25.0  |  1.01    Ca    8.7      2021 11:52    TPro  6.6  /  Alb  4.0  /  TBili  0.7  /  DBili  x   /  AST  34  /  ALT  32  /  AlkPhos  48  11-13    LIVER FUNCTIONS - ( 2021 11:52 )  Alb: 4.0 g/dL / Pro: 6.6 g/dL / ALK PHOS: 48 U/L / ALT: 32 U/L / AST: 34 U/L / GGT: x             Urinalysis Basic - ( 2021 12:11 )    Color: Yellow / Appearance: Clear / S.015 / pH: x  Gluc: x / Ketone: Moderate  / Bili: Negative / Urobili: Negative mg/dL   Blood: x / Protein: 30 mg/dL / Nitrite: Negative   Leuk Esterase: Trace / RBC: 11-25 /HPF / WBC 3-5   Sq Epi: x / Non Sq Epi: Occasional / Bacteria: Occasional          MEDICATIONS:      PRN Medications:  • PRN Medications since last evaluation	  • PRN Details	    Current Medications:      Medication Side Effects:  • Medication Side Effects or Adverse Reactions (new or ongoing)	None known    MENTAL STATUS EXAM:   • Level of Consciousness	Alert  • General Appearance	Well developed  • Body Habitus	Well nourished  • Hygiene	Good  • Grooming	fair  • Behavior	Cooperative  • Eye Contact	Good  • Relatedness	fair  • Impulse Control	Normal  • Muscle Tone / Strength	Normal muscle tone/strength  • Abnormal Movements	No abnormal movements  • Gait / Station	Normal gait / station  • Speech Volume	soft  • Speech Rate	Normal  • Speech Spontaneity	delayed  • Speech Articulation	Normal  • Mood	Normal  • Affect Quality	depressed sad  • Affect Range	constricted  • Affect Congruence	Congruent  • Thought Process	Linear  • Thought Associations	Normal  • Thought Content	preoccupied  • Perceptions	No abnormalities  • Oriented to Time	Yes  • Oriented to Place	Yes  • Oriented to Situation	Yes  • Oriented to Person	Yes  • Attention / Concentration	Normal  • Estimated Intelligence	Average  • Recent Memory	Normal  • Remote Memory	Normal  • Fund of Knowledge	Normal  • Language	No abnormalities noted  • Judgment (regarding everyday events)	impaired  • Insight (regarding psychiatric illness)	Fair    SUICIDALITY:   • Suicidality (Interval)	passive active SI with plan to jump from height    HOMICIDALITY/AGGRESSION:   • Homicidality/Aggression	none known    DIAGNOSIS DSM-V:    Psychiatric Diagnosis (Corresponds to DSM-IV Axis I, II):   HEALTH ISSUES - PROBLEM Dx:  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           Medical Diagnosis (Corresponds to DSM-IV Axis III):  • Axis III	  none    ASSESSMENT OF CURRENT CONDITION:   Summary (include case differential, formulation and patient response to therapy):   Pt endorsing passive and active SI with plan to jump off a mountain or height, building".  He claims 20 % intention of acting on then and basing on outcome of his marriage.  He also feels he needs to get on meds for his mood and invest in therapy also as an attempt to reconcile with wife.  Pt does admit to a "plan B", to cash out 401k, pay off home for wife and use rest for he and 26 yo emotionally disturbed son.  Pt denies HI and no evidence agitation or aggression.  Pt refers to children as protective factors and became tearful.  Pt requesting inpt psych admission and will sign voluntary.  SOH reviewing.    Risk Assessment (consider static vs modifiable risk factors and protective factors; comment on level of risk for dangerous behavior):     PLAN  Voluntary admission    COVID Exposure Screen- Patient    1.         *Have you had a COVID-19 test in the last 90 days?  (  ) Yes  ( x ) No   (  ) Unknown- Reason: _____    2.         *Have you tested positive for COVID-19 antibodies? (  ) Yes   (x  ) No   (  ) Unknown- Reason:    3.         *Have you received 2 doses of the COVID-19 vaccine? (  ) Yes   (x  ) No   (  ) Unknown- Reason: _____    4.         *In the past 10 days, have you been around anyone with a positive COVID-19 test?* (  ) Yes   (  ) No   ( x ) Unknown- Reason: ____    5.         *Have you been out of [place] within the past 10 days?* (  ) Yes   (x  ) No   (  ) Unknown- Reason: _____

## 2022-01-12 NOTE — BH INPATIENT PSYCHIATRY ASSESSMENT NOTE - NS ED [facility] MED ROS PSYCHIATRIC
See HPI Drysol Pregnancy And Lactation Text: This medication is considered safe during pregnancy and breast feeding.

## 2022-03-22 ENCOUNTER — APPOINTMENT (OUTPATIENT)
Dept: NEUROLOGY | Facility: CLINIC | Age: 62
End: 2022-03-22
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
## 2022-08-15 ENCOUNTER — APPOINTMENT (OUTPATIENT)
Dept: INTERNAL MEDICINE | Facility: CLINIC | Age: 62
End: 2022-08-15

## 2022-08-18 NOTE — ED ADULT NURSE NOTE - NS ED NURSE DISCH DISPOSITION
How Severe Is Your Skin Lesion?: moderate
Have Your Skin Lesions Been Treated?: not been treated
Is This A New Presentation, Or A Follow-Up?: Skin Lesion
Transferred

## 2023-06-05 NOTE — ED PROVIDER NOTE - GASTROINTESTINAL NEGATIVE STATEMENT, MLM
Patient aware of elevated 1 hour gtt. 3 hour gtt has been ordered and scheduled.
no abdominal pain, no bloating, no constipation, no diarrhea, no nausea and no vomiting.

## 2023-06-07 NOTE — ED CDU PROVIDER DISPOSITION NOTE - NS ED MD EM SUBS DAY SELECT 2
Large Joint Aspiration/Injection: R knee    Date/Time: 6/7/2023 3:15 PM  Performed by: Kylie Curiel PA-C  Authorized by: Kylie Curiel PA-C     Consent Done?:  Yes (Verbal)  Indications:  Pain and arthritis  Site marked: the procedure site was marked    Timeout: prior to procedure the correct patient, procedure, and site was verified    Prep: patient was prepped and draped in usual sterile fashion      Local anesthesia used?: Yes    Local anesthetic:  Topical anesthetic and lidocaine 2% without epinephrine  Ultrasonic Guidance for needle placement?: No    Approach:  Anterolateral  Location:  Knee  Site:  R knee  Medications:  48 mg hylan g-f 20 48 mg/6 mL; 2 mL LIDOcaine HCL 20 mg/ml (2%) 20 mg/mL (2 %)  Patient tolerance:  Patient tolerated the procedure well with no immediate complications   44760 - High Complexity

## 2023-09-22 NOTE — ED PROVIDER NOTE - CROS ED SKIN ALL NEG
[facility] - [address]  [place] [address]  Phone: [phone]  Fax: [phone]  [facility]: [phone]    Pt Name: Selwyn Crowder  Injury Date: 09/21/2023   Employee ID:  Date of First Treatment: 09/22/2023   Company: Networked reference to record [id number]      Appointment Time: 10:15 AM Arrived: 1030   Provider: Ana Muller PA-C Time Out:1145     Office Treatment:   1. Fall, initial encounter    2. Contusion of left knee, initial encounter    3. Knee strain, right, initial encounter    4. Arm contusion, left, initial encounter                     Return Appointment: not necessary. Can return to work immediately with recommendations to ice, keep knees compressed in ACE bandage, more frequent breaks if necessary, NSAIDS for pain          negative...

## 2023-09-25 NOTE — BH PSYCHOLOGY - CLINICIAN PSYCHOTHERAPY NOTE - NSBHPSYCHOLGOALS_PSY_A_CORE
Decrease symptoms/Improve level of independent functioning/Treatment compliance
Decrease symptoms/Prevent relapse/Treatment compliance
Decrease symptoms/Improve level of independent functioning/Treatment compliance
Closure 2 Information: This tab is for additional flaps and grafts, including complex repair and grafts and complex repair and flaps. You can also specify a different location for the additional defect, if the location is the same you do not need to select a new one. We will insert the automated text for the repair you select below just as we do for solitary flaps and grafts. Please note that at this time if you select a location with a different insurance zone you will need to override the ICD10 and CPT if appropriate.

## 2025-03-17 ENCOUNTER — APPOINTMENT (OUTPATIENT)
Dept: ORTHOPEDIC SURGERY | Facility: CLINIC | Age: 65
End: 2025-03-17
Payer: COMMERCIAL

## 2025-03-17 VITALS — WEIGHT: 210 LBS | HEIGHT: 74 IN | BODY MASS INDEX: 26.95 KG/M2

## 2025-03-17 DIAGNOSIS — M19.012 PRIMARY OSTEOARTHRITIS, LEFT SHOULDER: ICD-10-CM

## 2025-03-17 DIAGNOSIS — M75.31 CALCIFIC TENDINITIS OF RIGHT SHOULDER: ICD-10-CM

## 2025-03-17 DIAGNOSIS — M19.011 PRIMARY OSTEOARTHRITIS, RIGHT SHOULDER: ICD-10-CM

## 2025-03-17 PROCEDURE — 73030 X-RAY EXAM OF SHOULDER: CPT | Mod: 50

## 2025-03-17 PROCEDURE — 73010 X-RAY EXAM OF SHOULDER BLADE: CPT | Mod: 50

## 2025-03-17 PROCEDURE — 99204 OFFICE O/P NEW MOD 45 MIN: CPT

## 2025-03-17 RX ORDER — LITHIUM CARBONATE 600 MG/1
CAPSULE ORAL
Refills: 0 | Status: ACTIVE | COMMUNITY

## 2025-03-17 RX ORDER — METHYLPREDNISOLONE 4 MG/1
4 TABLET ORAL
Qty: 1 | Refills: 0 | Status: ACTIVE | COMMUNITY
Start: 2025-03-17 | End: 1900-01-01

## 2025-03-17 RX ORDER — LAMOTRIGINE 150 MG/1
TABLET ORAL
Refills: 0 | Status: ACTIVE | COMMUNITY

## 2025-03-19 NOTE — BH PSYCHOLOGY - CLINICIAN PSYCHOTHERAPY NOTE - NSBHPSYCHOLBILLFAM_PSY_A_CORE
----- Message from Issa Vegas sent at 3/18/2025  4:15 PM EDT -----  Call Aditya Cobb Their labs were normal  
[id number] - 16 to 37 minutes
[id number] - 16 to 37 minutes
[id number] - 53 minutes and above

## 2025-03-31 ENCOUNTER — APPOINTMENT (OUTPATIENT)
Dept: ORTHOPEDIC SURGERY | Facility: CLINIC | Age: 65
End: 2025-03-31